# Patient Record
Sex: FEMALE | Race: OTHER | HISPANIC OR LATINO | URBAN - METROPOLITAN AREA
[De-identification: names, ages, dates, MRNs, and addresses within clinical notes are randomized per-mention and may not be internally consistent; named-entity substitution may affect disease eponyms.]

---

## 2017-09-29 ENCOUNTER — EMERGENCY (EMERGENCY)
Facility: HOSPITAL | Age: 32
LOS: 1 days | Discharge: HOME | End: 2017-09-29
Attending: EMERGENCY MEDICINE | Admitting: EMERGENCY MEDICINE
Payer: COMMERCIAL

## 2017-09-29 VITALS
DIASTOLIC BLOOD PRESSURE: 75 MMHG | SYSTOLIC BLOOD PRESSURE: 104 MMHG | OXYGEN SATURATION: 100 % | TEMPERATURE: 99 F | HEART RATE: 74 BPM | RESPIRATION RATE: 16 BRPM

## 2017-09-29 VITALS
OXYGEN SATURATION: 100 % | TEMPERATURE: 98 F | DIASTOLIC BLOOD PRESSURE: 68 MMHG | RESPIRATION RATE: 16 BRPM | HEART RATE: 82 BPM | SYSTOLIC BLOOD PRESSURE: 102 MMHG

## 2017-09-29 LAB
APPEARANCE UR: SIGNIFICANT CHANGE UP
BILIRUB UR-MCNC: NEGATIVE — SIGNIFICANT CHANGE UP
BLOOD UR QL VISUAL: HIGH
COLOR SPEC: SIGNIFICANT CHANGE UP
GLUCOSE UR-MCNC: NEGATIVE — SIGNIFICANT CHANGE UP
KETONES UR-MCNC: NEGATIVE — SIGNIFICANT CHANGE UP
LEUKOCYTE ESTERASE UR-ACNC: HIGH
MUCOUS THREADS # UR AUTO: SIGNIFICANT CHANGE UP
NITRITE UR-MCNC: NEGATIVE — SIGNIFICANT CHANGE UP
NON-SQ EPI CELLS # UR AUTO: <1 — SIGNIFICANT CHANGE UP
PH UR: 7 — SIGNIFICANT CHANGE UP (ref 4.6–8)
PROT UR-MCNC: 30 — HIGH
RBC CASTS # UR COMP ASSIST: >50 — HIGH (ref 0–?)
SP GR SPEC: 1.01 — SIGNIFICANT CHANGE UP (ref 1–1.03)
SQUAMOUS # UR AUTO: SIGNIFICANT CHANGE UP
UROBILINOGEN FLD QL: NORMAL E.U. — SIGNIFICANT CHANGE UP (ref 0.1–0.2)
WBC CLUMPS #/AREA URNS HPF: PRESENT — HIGH (ref 0–?)
WBC UR QL: SIGNIFICANT CHANGE UP (ref 0–?)

## 2017-09-29 PROCEDURE — 99284 EMERGENCY DEPT VISIT MOD MDM: CPT

## 2017-09-29 PROCEDURE — 74176 CT ABD & PELVIS W/O CONTRAST: CPT | Mod: 26,GC

## 2017-09-29 RX ORDER — CEPHALEXIN 500 MG
1 CAPSULE ORAL
Qty: 10 | Refills: 0
Start: 2017-09-29 | End: 2017-10-04

## 2017-09-29 NOTE — ED PROVIDER NOTE - MEDICAL DECISION MAKING DETAILS
32 yo F presenting with sudden onset L lateral mid back pain radiating to the R mid lateral back and the LUQ, RUQ, and epigastrium with associated NBNB emesis. Pt had dysuria and frequency yesterday. UA, UPT pending. Will consider US vs CT abdomen to r/o nephrolithiasis. Will reassess.

## 2017-09-29 NOTE — ED ADULT NURSE NOTE - OBJECTIVE STATEMENT
Pt. A&Ox4, c/o b/l flank pain starting this AM with dysuria, nausea and urinary frequency. Went to UC this morning and then sent to ER for CT. Denies any fever/chills, abdominal pain or diarrhea. Urine sent. Awaiting CT. VSS at this time. Will continue to monitor.

## 2017-09-29 NOTE — ED PROVIDER NOTE - CARDIAC, MLM
Normal rate, regular rhythm.  Heart sounds S1, S2.  No murmurs, rubs or gallops. no pedal edema bilat

## 2017-09-29 NOTE — ED PROVIDER NOTE - OBJECTIVE STATEMENT
32 yo F presenting with 5 hour hx of intermittent sharp L lateral mid back pain that radiates to the R lateral mid back and to the LUQ, RUQ and epigastric regions of the abdomen that lasts a few hours each time ranging from 1-9/10 in severity that woke pt up this morning. Pt did not take any meds for pain. Walking alleviates the pain. No hx of sx.     Meds: MVI  PCP/GYN: Saskia  Pharm: [id number] [address]

## 2017-09-29 NOTE — ED PROVIDER NOTE - ATTENDING CONTRIBUTION TO CARE
Seen and examined, no pain at time of exam, refusing pain meds, acute onset of flank pain, episodic, no fever/chills, no sick contacts, no hematemesis/coffee grounds, no hx of ovarian cyst or irreg. period. In ED pt. NAD, MMM, clear lungs, soft, flat, NT abd, no hernia, +mild L CVAT, no edema, NT calves.

## 2017-09-29 NOTE — ED PROVIDER NOTE - PROGRESS NOTE DETAILS
Pt states her pain is improved.  CT a/p shows no nephrolithiasis or hydronephrosis. UA shows leukocyte esterase and WBC clump. Will d/c pt with rx for keflex x 5 days and return precautions for lower quadrant and/or back tenderness. Pt states her pain is improved. No recurrent pain for hrs. in ED.  CT a/p shows no nephrolithiasis or hydronephrosis. Repeat exam normal. Unclear if passed stone or other etiology. UA shows leukocyte esterase and WBC clump. Will d/c pt with rx for keflex x 5 days and return precautions for lower quadrant and/or back tenderness.

## 2017-09-29 NOTE — ED PROVIDER NOTE - CARE PLAN
Principal Discharge DX:	Flank pain  Instructions for follow-up, activity and diet:	1. You were seen for back pain and flank pain. Your CT scan showed no kidney stones. Your urine showed some white blood cells.  2. Take motrin or ibuprofen for your pain. Take keflex as prescribed from your pharmacy.   3. Follow up with your primary care physician within 48 hours.  4. Return to the emergency department immediately if you experience back pain, abdominal pain, fever, fainting, or bleeding.

## 2017-09-29 NOTE — ED PROVIDER NOTE - PLAN OF CARE
1. You were seen for back pain and flank pain. Your CT scan showed no kidney stones. Your urine showed some white blood cells.  2. Take motrin or ibuprofen for your pain. Take keflex as prescribed from your pharmacy.   3. Follow up with your primary care physician within 48 hours.  4. Return to the emergency department immediately if you experience back pain, abdominal pain, fever, fainting, or bleeding.

## 2018-01-19 ENCOUNTER — EMERGENCY (EMERGENCY)
Facility: HOSPITAL | Age: 33
LOS: 1 days | Discharge: HOME | End: 2018-01-19
Admitting: EMERGENCY MEDICINE
Payer: COMMERCIAL

## 2018-01-19 VITALS
RESPIRATION RATE: 18 BRPM | SYSTOLIC BLOOD PRESSURE: 118 MMHG | HEART RATE: 89 BPM | DIASTOLIC BLOOD PRESSURE: 83 MMHG | TEMPERATURE: 98 F | OXYGEN SATURATION: 100 %

## 2018-01-19 VITALS
RESPIRATION RATE: 16 BRPM | DIASTOLIC BLOOD PRESSURE: 93 MMHG | OXYGEN SATURATION: 98 % | TEMPERATURE: 98 F | SYSTOLIC BLOOD PRESSURE: 129 MMHG | HEART RATE: 74 BPM

## 2018-01-19 LAB
ALBUMIN SERPL ELPH-MCNC: 4.3 G/DL — SIGNIFICANT CHANGE UP (ref 3.3–5)
ALP SERPL-CCNC: 43 U/L — SIGNIFICANT CHANGE UP (ref 40–120)
ALT FLD-CCNC: 21 U/L — SIGNIFICANT CHANGE UP (ref 4–33)
APPEARANCE UR: SIGNIFICANT CHANGE UP
AST SERPL-CCNC: 26 U/L — SIGNIFICANT CHANGE UP (ref 4–32)
BACTERIA # UR AUTO: SIGNIFICANT CHANGE UP
BASOPHILS # BLD AUTO: 0.05 K/UL — SIGNIFICANT CHANGE UP (ref 0–0.2)
BASOPHILS NFR BLD AUTO: 0.7 % — SIGNIFICANT CHANGE UP (ref 0–2)
BILIRUB SERPL-MCNC: 0.3 MG/DL — SIGNIFICANT CHANGE UP (ref 0.2–1.2)
BILIRUB UR-MCNC: NEGATIVE — SIGNIFICANT CHANGE UP
BLD GP AB SCN SERPL QL: NEGATIVE — SIGNIFICANT CHANGE UP
BLOOD UR QL VISUAL: HIGH
BUN SERPL-MCNC: 10 MG/DL — SIGNIFICANT CHANGE UP (ref 7–23)
CALCIUM SERPL-MCNC: 9.4 MG/DL — SIGNIFICANT CHANGE UP (ref 8.4–10.5)
CHLORIDE SERPL-SCNC: 102 MMOL/L — SIGNIFICANT CHANGE UP (ref 98–107)
CO2 SERPL-SCNC: 23 MMOL/L — SIGNIFICANT CHANGE UP (ref 22–31)
COLOR SPEC: SIGNIFICANT CHANGE UP
CREAT SERPL-MCNC: 0.66 MG/DL — SIGNIFICANT CHANGE UP (ref 0.5–1.3)
EOSINOPHIL # BLD AUTO: 0.12 K/UL — SIGNIFICANT CHANGE UP (ref 0–0.5)
EOSINOPHIL NFR BLD AUTO: 1.8 % — SIGNIFICANT CHANGE UP (ref 0–6)
GLUCOSE SERPL-MCNC: 82 MG/DL — SIGNIFICANT CHANGE UP (ref 70–99)
GLUCOSE UR-MCNC: NEGATIVE — SIGNIFICANT CHANGE UP
HCG SERPL-ACNC: 123.8 MIU/ML — SIGNIFICANT CHANGE UP
HCT VFR BLD CALC: 36.5 % — SIGNIFICANT CHANGE UP (ref 34.5–45)
HGB BLD-MCNC: 12.1 G/DL — SIGNIFICANT CHANGE UP (ref 11.5–15.5)
IMM GRANULOCYTES # BLD AUTO: 0.01 # — SIGNIFICANT CHANGE UP
IMM GRANULOCYTES NFR BLD AUTO: 0.1 % — SIGNIFICANT CHANGE UP (ref 0–1.5)
KETONES UR-MCNC: NEGATIVE — SIGNIFICANT CHANGE UP
LEUKOCYTE ESTERASE UR-ACNC: HIGH
LYMPHOCYTES # BLD AUTO: 2.57 K/UL — SIGNIFICANT CHANGE UP (ref 1–3.3)
LYMPHOCYTES # BLD AUTO: 37.9 % — SIGNIFICANT CHANGE UP (ref 13–44)
MCHC RBC-ENTMCNC: 30.8 PG — SIGNIFICANT CHANGE UP (ref 27–34)
MCHC RBC-ENTMCNC: 33.2 % — SIGNIFICANT CHANGE UP (ref 32–36)
MCV RBC AUTO: 92.9 FL — SIGNIFICANT CHANGE UP (ref 80–100)
MONOCYTES # BLD AUTO: 0.52 K/UL — SIGNIFICANT CHANGE UP (ref 0–0.9)
MONOCYTES NFR BLD AUTO: 7.7 % — SIGNIFICANT CHANGE UP (ref 2–14)
NEUTROPHILS # BLD AUTO: 3.51 K/UL — SIGNIFICANT CHANGE UP (ref 1.8–7.4)
NEUTROPHILS NFR BLD AUTO: 51.8 % — SIGNIFICANT CHANGE UP (ref 43–77)
NITRITE UR-MCNC: NEGATIVE — SIGNIFICANT CHANGE UP
NON-SQ EPI CELLS # UR AUTO: <1 — SIGNIFICANT CHANGE UP
NRBC # FLD: 0 — SIGNIFICANT CHANGE UP
PH UR: 7.5 — SIGNIFICANT CHANGE UP (ref 4.6–8)
PLATELET # BLD AUTO: 221 K/UL — SIGNIFICANT CHANGE UP (ref 150–400)
PMV BLD: 10.8 FL — SIGNIFICANT CHANGE UP (ref 7–13)
POTASSIUM SERPL-MCNC: 3.8 MMOL/L — SIGNIFICANT CHANGE UP (ref 3.5–5.3)
POTASSIUM SERPL-SCNC: 3.8 MMOL/L — SIGNIFICANT CHANGE UP (ref 3.5–5.3)
PROT SERPL-MCNC: 7.6 G/DL — SIGNIFICANT CHANGE UP (ref 6–8.3)
PROT UR-MCNC: 30 MG/DL — HIGH
RBC # BLD: 3.93 M/UL — SIGNIFICANT CHANGE UP (ref 3.8–5.2)
RBC # FLD: 13 % — SIGNIFICANT CHANGE UP (ref 10.3–14.5)
RBC CASTS # UR COMP ASSIST: >50 — HIGH (ref 0–?)
RH IG SCN BLD-IMP: POSITIVE — SIGNIFICANT CHANGE UP
SODIUM SERPL-SCNC: 141 MMOL/L — SIGNIFICANT CHANGE UP (ref 135–145)
SP GR SPEC: 1.01 — SIGNIFICANT CHANGE UP (ref 1–1.04)
SQUAMOUS # UR AUTO: SIGNIFICANT CHANGE UP
UROBILINOGEN FLD QL: NORMAL MG/DL — SIGNIFICANT CHANGE UP
WBC # BLD: 6.78 K/UL — SIGNIFICANT CHANGE UP (ref 3.8–10.5)
WBC # FLD AUTO: 6.78 K/UL — SIGNIFICANT CHANGE UP (ref 3.8–10.5)
WBC UR QL: SIGNIFICANT CHANGE UP (ref 0–?)

## 2018-01-19 PROCEDURE — 99285 EMERGENCY DEPT VISIT HI MDM: CPT

## 2018-01-19 PROCEDURE — 76830 TRANSVAGINAL US NON-OB: CPT | Mod: 26

## 2018-01-19 RX ORDER — NITROFURANTOIN MACROCRYSTAL 50 MG
1 CAPSULE ORAL
Qty: 14 | Refills: 0
Start: 2018-01-19 | End: 2018-01-25

## 2018-01-19 NOTE — ED PROVIDER NOTE - OBJECTIVE STATEMENT
32 year old female ( LMP 2017) no PMHx  c/o vaginal bleeding x 3 days. Patient reports she went to gyn  to confirm pregnancy via urine pregnancy test and beta hcg 50. Patient reports 6 days ago had severe cramping radiating to her back with 1 episode of vaginal bleeding when wiping. This prompted the patient to go see [facility]. They reported her beta hcg of 306, no pregnancy seen on sonogram. Patient reports she has had dark brown spotting for 3 days, which prompted a repeat sonogram yesterday (no pregnancy was seen).  Today at noon patient began to see bright red blood. Patient reports going through 3 pads in 8 hours with multiple medium sized clots. Patient states she still has cramping but it has decreased. Patient denies syncope, shortness of breath, headache, fevers, chest pain. Patient reports her OB is Dr Moran at [facility] in [facility].

## 2018-01-19 NOTE — ED ADULT TRIAGE NOTE - CHIEF COMPLAINT QUOTE
6 weeks pregnant with c/o lower abdominal cramping,  vaginal spotting for 3-4 days, and now having bright red vaginal bleeding.  LMP: 12/06/17.

## 2018-01-19 NOTE — ED PROVIDER NOTE - MEDICAL DECISION MAKING DETAILS
32 year old female ( LMP 2017) no PMHx  c/o vaginal bleeding x 3 days. Will order sonogram, type and screen, UA, beta hcg, CBC, CMP 32 year old female ( LMP 2017) no PMHx  c/o vaginal bleeding x 3 days. Will order US type and screen, UA, beta hcg, CBC, CMP, obgyn

## 2018-01-19 NOTE — ED PROVIDER NOTE - PROGRESS NOTE DETAILS
OBGYN seen pt at bedside. Pt stable for discharge and to follow up with private OBGYN on Monday. Ua reviewed. UC sent. Pt stable for discharge at this time.

## 2018-01-20 NOTE — CONSULT NOTE ADULT - SUBJECTIVE AND OBJECTIVE BOX
32  LMP 2017,+hcg, no PMHx  c/o vaginal bleeding x 3 days. Patient reports she went to gyn  to confirm pregnancy via urine pregnancy test and beta hcg 50. Patient reports 6 days ago had severe cramping radiating to her back with 1 episode of vaginal bleeding when wiping. This prompted the patient to go see [facility]. They reported her beta hcg of 306, no pregnancy seen on sonogram. Patient reports she has had dark brown spotting for 3 days, which prompted a repeat sonogram yesterday (no pregnancy was seen).  Today at noon patient began to see bright red blood. Patient reports going through 3 pads in 8 hours with multiple medium sized clots. Patient states she still has cramping but it has decreased. Had another bHCG perfromed yesterday which was 170. Today in ED, bhCG is 123. Patient denies syncope, shortness of breath, headache, fevers, chest pain.     PAST MEDICAL & SURGICAL HISTORY:  Gastritis  No significant past surgical history    Allergies    No Known Allergies    Intolerances      MEDICATIONS  (STANDING):    MEDICATIONS  (PRN):    FAMILY HISTORY:  Family history of essential hypertension (Father)           Vital Signs Last 24 Hrs  T(C): 36.8 (2018 23:01), Max: 36.9 (2018 17:15)  T(F): 98.3 (2018 23:01), Max: 98.4 (2018 17:15)  HR: 89 (2018 23:01) (74 - 89)  BP: 118/83 (2018 23:01) (118/83 - 129/93)  BP(mean): --  RR: 18 (2018 23:01) (16 - 18)  SpO2: 100% (2018 23:01) (98% - 100%)    PHYSICAL EXAM:      Constitutional: alert and oriented x 3    Breasts: no tenderness, no nodules    Respiratory: clear    Cardiovascular: regular rate and rhythm    Gastrointestinal: soft, non tender, + bowel sounds. No hepatosplenomegaly, no palpable masses    Genitourinary: NEFG  Cervix: closed/ long, no CMT, slight bleeding seen at cervical os  Uterus: normal size, non tender,  Adnexa: non tender, no palpable masses        LABS:                        12.1   6.78  )-----------( 221      ( 2018 19:47 )             36.5         141  |  102  |  10  ----------------------------<  82  3.8   |  23  |  0.66    Ca    9.4      2018 19:47    TPro  7.6  /  Alb  4.3  /  TBili  0.3  /  DBili  x   /  AST  26  /  ALT  21  /  AlkPhos  43        Urinalysis Basic - ( 2018 19:47 )    Color: PLYEL / Appearance: HAZY / S.007 / pH: 7.5  Gluc: NEGATIVE / Ketone: NEGATIVE  / Bili: NEGATIVE / Urobili: NORMAL mg/dL   Blood: LARGE / Protein: 30 mg/dL / Nitrite: NEGATIVE   Leuk Esterase: SMALL / RBC: >50 / WBC 10-25   Sq Epi: OCC / Non Sq Epi: x / Bacteria: FEW        Blood Type: A Positive      RADIOLOGY & ADDITIONAL STUDIES:  < from: US Transvaginal (18 @ 19:21) >    FINDINGS:    Uterus: 7.1 x 3.9 x 3.9 cm. Retroverted. No intrauterine gestational sac.    Endometrium: 5 mm. Within normal limits.    Right ovary: Not visualized.    Left ovary: 2.6 x 1.2 x 1.3 cm. Multiple collapsed small bowel loops and   trace fluid with low-level echoes noted in the left adnexa. No discrete   adnexal mass is visualized.    Fluid: Trace fluid in the left adnexa.    IMPRESSION:  No intrauterine pregnancy seen at this time.   Trace free fluid with echoes and multiple collapsed loops of small bowel   noted in the left adnexa. Although no discrete mass is evident, close   interval follow-up beta hCG and ultrasound are recommended to exclude   left adnexal ectopic pregnancy.    < end of copied text >

## 2018-01-20 NOTE — CONSULT NOTE ADULT - ASSESSMENT
32  LMP 2017,+hcg, no PMHx  c/o vaginal bleeding x 3 days w/downtrending bhcg, sab vs ectopic pregnancy.    -Given benign exam, stable vitals, no acute concern for ectopic pregnancy at this time  -Ectopic precautions given  -Follow up with Dr. Hall on  for repeat bHCG    PER Sarmiento, PGY2  Seen w/Dr. Brothers

## 2018-01-21 LAB
BACTERIA UR CULT: SIGNIFICANT CHANGE UP
SPECIMEN SOURCE: SIGNIFICANT CHANGE UP

## 2018-11-02 ENCOUNTER — TRANSCRIPTION ENCOUNTER (OUTPATIENT)
Age: 33
End: 2018-11-02

## 2018-11-24 ENCOUNTER — RESULT REVIEW (OUTPATIENT)
Age: 33
End: 2018-11-24

## 2019-02-11 ENCOUNTER — TRANSCRIPTION ENCOUNTER (OUTPATIENT)
Age: 34
End: 2019-02-11

## 2019-08-29 PROBLEM — K29.70 GASTRITIS, UNSPECIFIED, WITHOUT BLEEDING: Chronic | Status: ACTIVE | Noted: 2017-09-29

## 2019-09-11 ENCOUNTER — APPOINTMENT (OUTPATIENT)
Dept: ANTEPARTUM | Facility: CLINIC | Age: 34
End: 2019-09-11
Payer: COMMERCIAL

## 2019-09-11 ENCOUNTER — ASOB RESULT (OUTPATIENT)
Age: 34
End: 2019-09-11

## 2019-09-11 ENCOUNTER — APPOINTMENT (OUTPATIENT)
Dept: ANTEPARTUM | Facility: CLINIC | Age: 34
End: 2019-09-11

## 2019-09-11 PROCEDURE — 36415 COLL VENOUS BLD VENIPUNCTURE: CPT

## 2019-09-11 PROCEDURE — 76813 OB US NUCHAL MEAS 1 GEST: CPT

## 2019-09-11 PROCEDURE — 99201 OFFICE OUTPATIENT NEW 10 MINUTES: CPT | Mod: 25

## 2019-09-11 PROCEDURE — 76801 OB US < 14 WKS SINGLE FETUS: CPT

## 2019-09-11 PROCEDURE — 36416 COLLJ CAPILLARY BLOOD SPEC: CPT

## 2019-09-16 ENCOUNTER — TRANSCRIPTION ENCOUNTER (OUTPATIENT)
Age: 34
End: 2019-09-16

## 2019-10-09 ENCOUNTER — APPOINTMENT (OUTPATIENT)
Dept: ANTEPARTUM | Facility: CLINIC | Age: 34
End: 2019-10-09

## 2019-11-05 ENCOUNTER — APPOINTMENT (OUTPATIENT)
Dept: ANTEPARTUM | Facility: CLINIC | Age: 34
End: 2019-11-05
Payer: COMMERCIAL

## 2019-11-05 ENCOUNTER — ASOB RESULT (OUTPATIENT)
Age: 34
End: 2019-11-05

## 2019-11-05 PROCEDURE — 76817 TRANSVAGINAL US OBSTETRIC: CPT | Mod: 59

## 2019-11-05 PROCEDURE — 76811 OB US DETAILED SNGL FETUS: CPT

## 2020-03-22 ENCOUNTER — RESULT REVIEW (OUTPATIENT)
Age: 35
End: 2020-03-22

## 2020-03-22 ENCOUNTER — INPATIENT (INPATIENT)
Facility: HOSPITAL | Age: 35
LOS: 2 days | Discharge: HOME | End: 2020-03-25
Attending: SPECIALIST | Admitting: SPECIALIST
Payer: COMMERCIAL

## 2020-03-22 ENCOUNTER — TRANSCRIPTION ENCOUNTER (OUTPATIENT)
Age: 35
End: 2020-03-22

## 2020-03-22 VITALS
HEART RATE: 85 BPM | SYSTOLIC BLOOD PRESSURE: 130 MMHG | TEMPERATURE: 99 F | RESPIRATION RATE: 16 BRPM | DIASTOLIC BLOOD PRESSURE: 83 MMHG

## 2020-03-22 DIAGNOSIS — Z98.890 OTHER SPECIFIED POSTPROCEDURAL STATES: Chronic | ICD-10-CM

## 2020-03-22 DIAGNOSIS — Z3A.00 WEEKS OF GESTATION OF PREGNANCY NOT SPECIFIED: ICD-10-CM

## 2020-03-22 DIAGNOSIS — O26.899 OTHER SPECIFIED PREGNANCY RELATED CONDITIONS, UNSPECIFIED TRIMESTER: ICD-10-CM

## 2020-03-22 LAB
BASOPHILS # BLD AUTO: 0.02 K/UL — SIGNIFICANT CHANGE UP (ref 0–0.2)
BASOPHILS NFR BLD AUTO: 0.2 % — SIGNIFICANT CHANGE UP (ref 0–2)
BLD GP AB SCN SERPL QL: NEGATIVE — SIGNIFICANT CHANGE UP
EOSINOPHIL # BLD AUTO: 0.04 K/UL — SIGNIFICANT CHANGE UP (ref 0–0.5)
EOSINOPHIL NFR BLD AUTO: 0.4 % — SIGNIFICANT CHANGE UP (ref 0–6)
HCT VFR BLD CALC: 35.3 % — SIGNIFICANT CHANGE UP (ref 34.5–45)
HGB BLD-MCNC: 12.4 G/DL — SIGNIFICANT CHANGE UP (ref 11.5–15.5)
IMM GRANULOCYTES NFR BLD AUTO: 0.4 % — SIGNIFICANT CHANGE UP (ref 0–1.5)
LYMPHOCYTES # BLD AUTO: 1.51 K/UL — SIGNIFICANT CHANGE UP (ref 1–3.3)
LYMPHOCYTES # BLD AUTO: 17 % — SIGNIFICANT CHANGE UP (ref 13–44)
MCHC RBC-ENTMCNC: 33.1 PG — SIGNIFICANT CHANGE UP (ref 27–34)
MCHC RBC-ENTMCNC: 35.1 % — SIGNIFICANT CHANGE UP (ref 32–36)
MCV RBC AUTO: 94.1 FL — SIGNIFICANT CHANGE UP (ref 80–100)
MONOCYTES # BLD AUTO: 0.71 K/UL — SIGNIFICANT CHANGE UP (ref 0–0.9)
MONOCYTES NFR BLD AUTO: 8 % — SIGNIFICANT CHANGE UP (ref 2–14)
NEUTROPHILS # BLD AUTO: 6.57 K/UL — SIGNIFICANT CHANGE UP (ref 1.8–7.4)
NEUTROPHILS NFR BLD AUTO: 74 % — SIGNIFICANT CHANGE UP (ref 43–77)
NRBC # FLD: 0 K/UL — SIGNIFICANT CHANGE UP (ref 0–0)
PLATELET # BLD AUTO: 141 K/UL — LOW (ref 150–400)
PMV BLD: 11.7 FL — SIGNIFICANT CHANGE UP (ref 7–13)
RBC # BLD: 3.75 M/UL — LOW (ref 3.8–5.2)
RBC # FLD: 13.2 % — SIGNIFICANT CHANGE UP (ref 10.3–14.5)
RH IG SCN BLD-IMP: POSITIVE — SIGNIFICANT CHANGE UP
WBC # BLD: 8.89 K/UL — SIGNIFICANT CHANGE UP (ref 3.8–10.5)
WBC # FLD AUTO: 8.89 K/UL — SIGNIFICANT CHANGE UP (ref 3.8–10.5)

## 2020-03-22 RX ORDER — OXYTOCIN 10 UNIT/ML
2 VIAL (ML) INJECTION
Qty: 30 | Refills: 0 | Status: DISCONTINUED | OUTPATIENT
Start: 2020-03-22 | End: 2020-03-22

## 2020-03-22 RX ORDER — FAMOTIDINE 10 MG/ML
20 INJECTION INTRAVENOUS ONCE
Refills: 0 | Status: COMPLETED | OUTPATIENT
Start: 2020-03-22 | End: 2020-03-22

## 2020-03-22 RX ORDER — FAMOTIDINE 10 MG/ML
20 INJECTION INTRAVENOUS ONCE
Refills: 0 | Status: DISCONTINUED | OUTPATIENT
Start: 2020-03-22 | End: 2020-03-22

## 2020-03-22 RX ORDER — SODIUM CHLORIDE 9 MG/ML
1000 INJECTION, SOLUTION INTRAVENOUS
Refills: 0 | Status: DISCONTINUED | OUTPATIENT
Start: 2020-03-22 | End: 2020-03-23

## 2020-03-22 RX ORDER — SODIUM CHLORIDE 9 MG/ML
500 INJECTION, SOLUTION INTRAVENOUS ONCE
Refills: 0 | Status: COMPLETED | OUTPATIENT
Start: 2020-03-22 | End: 2020-03-22

## 2020-03-22 RX ORDER — OXYTOCIN 10 UNIT/ML
VIAL (ML) INJECTION
Qty: 20 | Refills: 0 | Status: COMPLETED | OUTPATIENT
Start: 2020-03-22 | End: 2020-03-22

## 2020-03-22 RX ADMIN — SODIUM CHLORIDE 500 MILLILITER(S): 9 INJECTION, SOLUTION INTRAVENOUS at 20:30

## 2020-03-22 RX ADMIN — FAMOTIDINE 20 MILLIGRAM(S): 10 INJECTION INTRAVENOUS at 23:33

## 2020-03-22 RX ADMIN — Medication 2 MILLIUNIT(S)/MIN: at 21:15

## 2020-03-22 NOTE — CHART NOTE - NSCHARTNOTEFT_GEN_A_CORE
PGY1 Note      Patient evaluated at bedside for fetal tachycardia. She is s/p 1L bolus prior to epidural at 7 pm, another 500 cc bolus at 8pm. FHR is elevated to 180s; patient is afebrile.      VS  T(C): 36.9 (20 @ 20:23)  HR: 59 (20 @ 21:10)  BP: 114/66 (20 @ 21:07)  RR: 18 (20 @ 18:02)  SpO2: 100% (20 @ 21:10)      SVE: 5/90/-2; AROM, light mec   FHR:180/mod cydney/-acel/-decel: cat 2   Goltry: ctx q 2-3 mins      Patient is afebrile; will give another 500 cc bolus for fetal tachycardia   -AROM  -Will start pitocin  -anticipate   -Continuos fetal monitoring    d/w Dr. Christopher Brown PGY1

## 2020-03-22 NOTE — OB PROVIDER TRIAGE NOTE - NSOBPROVIDERNOTE_OBGYN_ALL_OB_FT
This is a 34 year old  at 40.2 weeks gestational age presents with complaints of contractions q4-6 minutes, denies LOF, VB. patients requesting epidural for pain management.  denies fever, cough, sick contact.   ap course uncomplicated as per patient  gbs neg      med: gastritis  surg: D&C x 1  gyn: sab x 2, D&C x 1  2016 LEEP  ob: denies    current meds: pnv, folic acid    nkda    assessment  Vital Signs Last 24 Hrs  T(C): 37.4 (22 Mar 2020 16:41), Max: 37.4 (22 Mar 2020 16:23)  T(F): 99.32 (22 Mar 2020 16:41), Max: 99.32 (22 Mar 2020 16:41)  HR: 85 (22 Mar 2020 16:43) (85 - 85)  BP: 130/83 (22 Mar 2020 16:43) (130/83 - 130/83)  BP(mean): --  RR: 18 (22 Mar 2020 16:41) (16 - 18)  SpO2: --    sve 4/80/-1  resp: lungs clear bilaterally    Cat 1 EFM    plan discussed with dr rodriguez  admit for labor  approved for epidural prn   routine orders

## 2020-03-22 NOTE — OB PROVIDER TRIAGE NOTE - HISTORY OF PRESENT ILLNESS
This is a 34 year old  at 40.2 weeks gestational age presents with complaints of contractions q4-6 minutes, denies LOF, VB. patients requesting epidural for pain management.  denies fever, cough, sick contact.   ap course uncomplicated as per patient  gbs neg

## 2020-03-22 NOTE — OB PROVIDER H&P - ASSESSMENT
This is a 34 year old  at 40.2 weeks gestational age admitted for labor    plan discussed with dr rodriguez  admit for labor  approved for epidural prn   routine orders

## 2020-03-22 NOTE — OB PROVIDER TRIAGE NOTE - NSHPPHYSICALEXAM_GEN_ALL_CORE
Vital Signs Last 24 Hrs  T(C): 37.4 (22 Mar 2020 16:41), Max: 37.4 (22 Mar 2020 16:23)  T(F): 99.32 (22 Mar 2020 16:41), Max: 99.32 (22 Mar 2020 16:41)  HR: 85 (22 Mar 2020 16:43) (85 - 85)  BP: 130/83 (22 Mar 2020 16:43) (130/83 - 130/83)  BP(mean): --  RR: 18 (22 Mar 2020 16:41) (16 - 18)  SpO2: --    sve 4/80/-1  resp: lungs clear bilaterally

## 2020-03-23 LAB — T PALLIDUM AB TITR SER: NEGATIVE — SIGNIFICANT CHANGE UP

## 2020-03-23 PROCEDURE — 88307 TISSUE EXAM BY PATHOLOGIST: CPT | Mod: 26

## 2020-03-23 RX ORDER — SIMETHICONE 80 MG/1
80 TABLET, CHEWABLE ORAL EVERY 4 HOURS
Refills: 0 | Status: DISCONTINUED | OUTPATIENT
Start: 2020-03-23 | End: 2020-03-25

## 2020-03-23 RX ORDER — GENTAMICIN SULFATE 40 MG/ML
350 VIAL (ML) INJECTION ONCE
Refills: 0 | Status: COMPLETED | OUTPATIENT
Start: 2020-03-23 | End: 2020-03-23

## 2020-03-23 RX ORDER — MAGNESIUM HYDROXIDE 400 MG/1
30 TABLET, CHEWABLE ORAL
Refills: 0 | Status: DISCONTINUED | OUTPATIENT
Start: 2020-03-23 | End: 2020-03-25

## 2020-03-23 RX ORDER — ACETAMINOPHEN 500 MG
975 TABLET ORAL
Refills: 0 | Status: DISCONTINUED | OUTPATIENT
Start: 2020-03-23 | End: 2020-03-25

## 2020-03-23 RX ORDER — BENZOCAINE 10 %
1 GEL (GRAM) MUCOUS MEMBRANE EVERY 6 HOURS
Refills: 0 | Status: DISCONTINUED | OUTPATIENT
Start: 2020-03-23 | End: 2020-03-25

## 2020-03-23 RX ORDER — OXYCODONE HYDROCHLORIDE 5 MG/1
5 TABLET ORAL
Refills: 0 | Status: DISCONTINUED | OUTPATIENT
Start: 2020-03-23 | End: 2020-03-25

## 2020-03-23 RX ORDER — AMPICILLIN TRIHYDRATE 250 MG
2 CAPSULE ORAL EVERY 6 HOURS
Refills: 0 | Status: DISCONTINUED | OUTPATIENT
Start: 2020-03-23 | End: 2020-03-23

## 2020-03-23 RX ORDER — TETANUS TOXOID, REDUCED DIPHTHERIA TOXOID AND ACELLULAR PERTUSSIS VACCINE, ADSORBED 5; 2.5; 8; 8; 2.5 [IU]/.5ML; [IU]/.5ML; UG/.5ML; UG/.5ML; UG/.5ML
0.5 SUSPENSION INTRAMUSCULAR ONCE
Refills: 0 | Status: DISCONTINUED | OUTPATIENT
Start: 2020-03-23 | End: 2020-03-25

## 2020-03-23 RX ORDER — AMPICILLIN TRIHYDRATE 250 MG
2 CAPSULE ORAL ONCE
Refills: 0 | Status: COMPLETED | OUTPATIENT
Start: 2020-03-23 | End: 2020-03-23

## 2020-03-23 RX ORDER — SODIUM CHLORIDE 9 MG/ML
3 INJECTION INTRAMUSCULAR; INTRAVENOUS; SUBCUTANEOUS EVERY 8 HOURS
Refills: 0 | Status: DISCONTINUED | OUTPATIENT
Start: 2020-03-23 | End: 2020-03-25

## 2020-03-23 RX ORDER — IBUPROFEN 200 MG
600 TABLET ORAL EVERY 6 HOURS
Refills: 0 | Status: DISCONTINUED | OUTPATIENT
Start: 2020-03-23 | End: 2020-03-25

## 2020-03-23 RX ORDER — SODIUM CHLORIDE 9 MG/ML
1000 INJECTION, SOLUTION INTRAVENOUS ONCE
Refills: 0 | Status: COMPLETED | OUTPATIENT
Start: 2020-03-23 | End: 2020-03-23

## 2020-03-23 RX ORDER — LANOLIN
1 OINTMENT (GRAM) TOPICAL EVERY 6 HOURS
Refills: 0 | Status: DISCONTINUED | OUTPATIENT
Start: 2020-03-23 | End: 2020-03-25

## 2020-03-23 RX ORDER — ACETAMINOPHEN 500 MG
975 TABLET ORAL ONCE
Refills: 0 | Status: COMPLETED | OUTPATIENT
Start: 2020-03-23 | End: 2020-03-23

## 2020-03-23 RX ORDER — OXYTOCIN 10 UNIT/ML
333.33 VIAL (ML) INJECTION
Qty: 20 | Refills: 0 | Status: DISCONTINUED | OUTPATIENT
Start: 2020-03-23 | End: 2020-03-23

## 2020-03-23 RX ORDER — AER TRAVELER 0.5 G/1
1 SOLUTION RECTAL; TOPICAL EVERY 4 HOURS
Refills: 0 | Status: DISCONTINUED | OUTPATIENT
Start: 2020-03-23 | End: 2020-03-25

## 2020-03-23 RX ORDER — DIPHENHYDRAMINE HCL 50 MG
25 CAPSULE ORAL EVERY 6 HOURS
Refills: 0 | Status: DISCONTINUED | OUTPATIENT
Start: 2020-03-23 | End: 2020-03-25

## 2020-03-23 RX ORDER — OXYCODONE HYDROCHLORIDE 5 MG/1
5 TABLET ORAL ONCE
Refills: 0 | Status: DISCONTINUED | OUTPATIENT
Start: 2020-03-23 | End: 2020-03-25

## 2020-03-23 RX ORDER — HYDROCORTISONE 1 %
1 OINTMENT (GRAM) TOPICAL EVERY 6 HOURS
Refills: 0 | Status: DISCONTINUED | OUTPATIENT
Start: 2020-03-23 | End: 2020-03-25

## 2020-03-23 RX ORDER — PRAMOXINE HYDROCHLORIDE 150 MG/15G
1 AEROSOL, FOAM RECTAL EVERY 4 HOURS
Refills: 0 | Status: DISCONTINUED | OUTPATIENT
Start: 2020-03-23 | End: 2020-03-25

## 2020-03-23 RX ORDER — KETOROLAC TROMETHAMINE 30 MG/ML
30 SYRINGE (ML) INJECTION ONCE
Refills: 0 | Status: DISCONTINUED | OUTPATIENT
Start: 2020-03-23 | End: 2020-03-23

## 2020-03-23 RX ORDER — IBUPROFEN 200 MG
600 TABLET ORAL EVERY 6 HOURS
Refills: 0 | Status: COMPLETED | OUTPATIENT
Start: 2020-03-23 | End: 2021-02-19

## 2020-03-23 RX ORDER — GLYCERIN ADULT
1 SUPPOSITORY, RECTAL RECTAL AT BEDTIME
Refills: 0 | Status: DISCONTINUED | OUTPATIENT
Start: 2020-03-23 | End: 2020-03-25

## 2020-03-23 RX ORDER — AMPICILLIN TRIHYDRATE 250 MG
CAPSULE ORAL
Refills: 0 | Status: DISCONTINUED | OUTPATIENT
Start: 2020-03-23 | End: 2020-03-23

## 2020-03-23 RX ORDER — DIBUCAINE 1 %
1 OINTMENT (GRAM) RECTAL EVERY 6 HOURS
Refills: 0 | Status: DISCONTINUED | OUTPATIENT
Start: 2020-03-23 | End: 2020-03-25

## 2020-03-23 RX ADMIN — SODIUM CHLORIDE 2000 MILLILITER(S): 9 INJECTION, SOLUTION INTRAVENOUS at 03:49

## 2020-03-23 RX ADMIN — Medication 975 MILLIGRAM(S): at 01:33

## 2020-03-23 RX ADMIN — Medication 600 MILLIGRAM(S): at 17:00

## 2020-03-23 RX ADMIN — Medication 975 MILLIGRAM(S): at 00:42

## 2020-03-23 RX ADMIN — Medication 500 MILLIGRAM(S): at 01:32

## 2020-03-23 RX ADMIN — SODIUM CHLORIDE 3 MILLILITER(S): 9 INJECTION INTRAMUSCULAR; INTRAVENOUS; SUBCUTANEOUS at 06:52

## 2020-03-23 RX ADMIN — Medication 30 MILLIGRAM(S): at 03:16

## 2020-03-23 RX ADMIN — Medication 30 MILLIGRAM(S): at 02:39

## 2020-03-23 RX ADMIN — Medication 600 MILLIGRAM(S): at 16:19

## 2020-03-23 RX ADMIN — SODIUM CHLORIDE 3 MILLILITER(S): 9 INJECTION INTRAMUSCULAR; INTRAVENOUS; SUBCUTANEOUS at 22:40

## 2020-03-23 RX ADMIN — Medication 600 MILLIGRAM(S): at 23:25

## 2020-03-23 RX ADMIN — Medication 600 MILLIGRAM(S): at 22:39

## 2020-03-23 RX ADMIN — Medication 216 GRAM(S): at 00:41

## 2020-03-23 RX ADMIN — Medication 1000 MILLIUNIT(S)/MIN: at 01:33

## 2020-03-23 NOTE — DISCHARGE NOTE OB - CARE PLAN
Principal Discharge DX:	 (normal spontaneous vaginal delivery)  Goal:	GOOD RECOVERY  Assessment and plan of treatment:	F/U 2 WEEKS

## 2020-03-23 NOTE — CHART NOTE - NSCHARTNOTEFT_GEN_A_CORE
Fetal tachycardia noted with early and variable decelerations in setting of tachysystole  Patient examined at bedside    Exam 10/100/+1, pushing  Vitals 39.1C, /81, HR 78, SpO2 %RA    35yo P0 at 40w2d admitted in labor.  Patient now in second stage, with fever and fetal tachycardia c/w intrauterine intraamniotic infection.    - Tylenol 975mg PO now  - Ampicillin/Gentamicin  - continue pushing, anticipate delivery. Possible operative delivery discussed if tracing worsens.  - placental cultures at delivery    Dr. White at bedside  S Giovanny, R4 Fetal tachycardia 170-180bpm noted with early and variable decelerations in setting of tachysystole  Patient examined at bedside    Exam 10/100/+1, pushing  Vitals 39.1C, /81, HR 78, SpO2 %RA    35yo P0 at 40w2d admitted in labor.  Patient now in second stage, with fever and fetal tachycardia c/w intrauterine intraamniotic infection.    - Tylenol 975mg PO now  - Ampicillin/Gentamicin  - continue pushing, anticipate delivery. Possible operative delivery discussed if tracing worsens.  - placental cultures at delivery    Dr. White at bedside  S Ewumi, R4

## 2020-03-23 NOTE — CHART NOTE - NSCHARTNOTEFT_GEN_A_CORE
Patient is 34y  old.    Event : c/o slight SOB    Vital Signs Last 24 Hrs  T(C): 36.7 (23 Mar 2020 16:30), Max: 39.1 (23 Mar 2020 00:31)  T(F): 98 (23 Mar 2020 16:30), Max: 102.38 (23 Mar 2020 00:31)  HR: 77 (23 Mar 2020 16:30) (53 - 145)  BP: 108/68 (23 Mar 2020 16:30) (92/52 - 157/85)  BP(mean): --  RR: 18 (23 Mar 2020 16:30) (16 - 18)  SpO2: 99% (23 Mar 2020 16:30) (84% - 100%)    I&O's Detail    22 Mar 2020 07:01  -  23 Mar 2020 07:00  --------------------------------------------------------  IN:    lactated ringers.: 125 mL    oxytocin Infusion: 1000 mL  Total IN: 1125 mL    OUT:    Estimated Blood Loss: 250 mL    Indwelling Catheter - Urethral: 1500 mL    Voided: 800 mL  Total OUT: 2550 mL    Total NET: -1425 mL      23 Mar 2020 07:01  -  23 Mar 2020 17:38  --------------------------------------------------------  IN:  Total IN: 0 mL    OUT:    Voided: 800 mL  Total OUT: 800 mL    Total NET: -800 mL      Labs:                        12.4   8.89  )-----------( 141      ( 22 Mar 2020 18:03 )             35.3         Evaluation : s/p NVD PPD#0  Intrapartum Temp s/p Amp/Genta x1   c/o slight SOB when she went to the bathroom today, felt 3 times today. No chest pain, palpitation or any lightheadedness. Patient looks comfortable  Heart RRR,   Lungs clear bilaterally  Orthostatic vitals normal, O2 Sat 100% on room air  Fundus firm  Moderate lochia  voiding good    Management and Follow-up plan :  will continue to monitor her closely  Will discuss with Attending               -------------------------------------------------------------------------------------------------------------

## 2020-03-23 NOTE — OB POSTPARTUM EVENT NOTE - NS_EVENTPTSUMMARY1_OBGYN_ALL_OB_FT
lying down 123/73 pulse 54  sitting 109/56 HR 66  standing 106/55 hr 75    Pt complains of mild lightheadness. pt denies complaints of pain. Fundus firm, midline and at umbilicus

## 2020-03-23 NOTE — OB RN DELIVERY SUMMARY - NS_TRUEKNOTA_OBGYN_ALL_OB
Patient A/O x 4, complaining of dizziness x this afternoon and high blood sugar. Patient states she checked her blood sugar today and it was 440mg/dl x 30 minutes ago. Patient states she didn't take her medication for diabetes tonight because, \"I know when it's high I'm supposed to come over here to the hospital\". Patient denies chest pain, N/V, abdominal pain, SOB.
None

## 2020-03-23 NOTE — OB PROVIDER DELIVERY SUMMARY - NSPROVIDERDELIVERYNOTE_OBGYN_ALL_OB_FT
Patient pushing with recurrent decels. Prolonged decel to 120bpm from baseline 170s, and decision made to proceed with RML to expedite delivery. RML performed, with subsequent delivery of fetal head in OA, restituting to EDNA. No nuchal cord, body and shoulders delivered without difficulty.   Placenta delivered intact. RML repaired, no other lacerations noted upon thorough evaluation of vagina, perineum, cervix.     EBL 250cc

## 2020-03-23 NOTE — PROVIDER CONTACT NOTE (OTHER) - ASSESSMENT
v/s BP    108/68 , HR 77  TEMP 36.7  O2  sat 99 % room air  ,RR  16 ,Pt denies chest Pain, dizziness , sore throat , cough .minimal vaginal bleeding , fundus firm , voiding freely , lungs clear , no calf tenderness

## 2020-03-23 NOTE — OB RN DELIVERY SUMMARY - NS_LABORCHARACTER_OBGYN_ALL_OB
Febrile (>38C)/Meconium staining/Chorioamnionitis/Augmentation of labor/External electronic FM/Antibiotics in labor/Fetal intolerance

## 2020-03-23 NOTE — DISCHARGE NOTE OB - PATIENT PORTAL LINK FT
You can access the FollowMyHealth Patient Portal offered by [facility] by registering at the following website: [email or web address]. By joining [employer]’s FollowMyHealth portal, you will also be able to view your health information using other applications (apps) compatible with our system.

## 2020-03-23 NOTE — DISCHARGE NOTE OB - CARE PROVIDER_API CALL
Willie Jo)  Obstetrics and Gynecology  [address] [place] [address]  Phone: [phone]  Fax: [phone]  Follow Up Time:

## 2020-03-23 NOTE — OB POSTPARTUM EVENT NOTE - NS_EVENTPTSUMMARY2_OBGYN_ALL_OB_FT
lying down 100/52 pulse 64  sitting 103/65 HR 59  standing 92/52 hr 88    pt denies complaints of pain, lightheadness or dizziness. Fundus firm, midline and at umbilicus

## 2020-03-23 NOTE — OB RN DELIVERY SUMMARY - NS_SEPSISRSKCALC_OBGYN_ALL_OB_FT
EOS calculated successfully. EOS Risk Factor: 0.5/1000 live births ([facility] national incidence); GA=40w3d; Temp=102.38; ROM=4.367; GBS='Negative'; Antibiotics='No antibiotics or any antibiotics < 2 hrs prior to birth'

## 2020-03-24 RX ORDER — FAMOTIDINE 10 MG/ML
20 INJECTION INTRAVENOUS DAILY
Refills: 0 | Status: DISCONTINUED | OUTPATIENT
Start: 2020-03-24 | End: 2020-03-25

## 2020-03-24 RX ADMIN — Medication 600 MILLIGRAM(S): at 15:55

## 2020-03-24 RX ADMIN — Medication 600 MILLIGRAM(S): at 16:45

## 2020-03-24 RX ADMIN — Medication 600 MILLIGRAM(S): at 22:00

## 2020-03-24 RX ADMIN — FAMOTIDINE 20 MILLIGRAM(S): 10 INJECTION INTRAVENOUS at 22:00

## 2020-03-24 RX ADMIN — Medication 600 MILLIGRAM(S): at 06:56

## 2020-03-24 RX ADMIN — Medication 600 MILLIGRAM(S): at 23:00

## 2020-03-24 RX ADMIN — SODIUM CHLORIDE 3 MILLILITER(S): 9 INJECTION INTRAMUSCULAR; INTRAVENOUS; SUBCUTANEOUS at 06:00

## 2020-03-24 RX ADMIN — Medication 600 MILLIGRAM(S): at 05:58

## 2020-03-24 NOTE — PROGRESS NOTE ADULT - SUBJECTIVE AND OBJECTIVE BOX
S: Patient doing well. Minimal lochia. Pain controlled.    O: Vital Signs Last 24 Hrs  T(C): 36.8 (24 Mar 2020 10:00), Max: 36.8 (24 Mar 2020 10:00)  T(F): 98.3 (24 Mar 2020 10:00), Max: 98.3 (24 Mar 2020 10:00)  HR: 59 (24 Mar 2020 10:00) (58 - 77)  BP: 113/59 (24 Mar 2020 10:00) (108/68 - 121/77)  BP(mean): --  RR: 18 (24 Mar 2020 10:00) (16 - 18)  SpO2: 100% (24 Mar 2020 10:00) (99% - 100%)    Gen: NAD  Abd: soft, NT, ND, fundus firm below umbilicus  Lochia: moderate  Ext: no tenderness    Labs:                        12.4   8.89  )-----------( 141      ( 22 Mar 2020 18:03 )             35.3       A: 34y PPD#1 s/p  doing well.  Plan: Pt seen no complaints .          Dc with instructions for 3/25

## 2020-03-24 NOTE — LACTATION INITIAL EVALUATION - INTERVENTION OUTCOME
Reviewed use of double electric breastpump and hand expression. Safe collection and handling of breastmilk reviewed. Proper cleansing of breast pump parts discussed.

## 2020-03-25 VITALS — HEART RATE: 55 BPM

## 2020-03-25 LAB
BASOPHILS # BLD AUTO: 0.03 K/UL — SIGNIFICANT CHANGE UP (ref 0–0.2)
BASOPHILS NFR BLD AUTO: 0.3 % — SIGNIFICANT CHANGE UP (ref 0–2)
EOSINOPHIL # BLD AUTO: 0.14 K/UL — SIGNIFICANT CHANGE UP (ref 0–0.5)
EOSINOPHIL NFR BLD AUTO: 1.4 % — SIGNIFICANT CHANGE UP (ref 0–6)
HCT VFR BLD CALC: 33 % — LOW (ref 34.5–45)
HGB BLD-MCNC: 11.5 G/DL — SIGNIFICANT CHANGE UP (ref 11.5–15.5)
IMM GRANULOCYTES NFR BLD AUTO: 0.6 % — SIGNIFICANT CHANGE UP (ref 0–1.5)
LYMPHOCYTES # BLD AUTO: 2.83 K/UL — SIGNIFICANT CHANGE UP (ref 1–3.3)
LYMPHOCYTES # BLD AUTO: 28.7 % — SIGNIFICANT CHANGE UP (ref 13–44)
MCHC RBC-ENTMCNC: 33.5 PG — SIGNIFICANT CHANGE UP (ref 27–34)
MCHC RBC-ENTMCNC: 34.8 % — SIGNIFICANT CHANGE UP (ref 32–36)
MCV RBC AUTO: 96.2 FL — SIGNIFICANT CHANGE UP (ref 80–100)
MONOCYTES # BLD AUTO: 0.62 K/UL — SIGNIFICANT CHANGE UP (ref 0–0.9)
MONOCYTES NFR BLD AUTO: 6.3 % — SIGNIFICANT CHANGE UP (ref 2–14)
NEUTROPHILS # BLD AUTO: 6.19 K/UL — SIGNIFICANT CHANGE UP (ref 1.8–7.4)
NEUTROPHILS NFR BLD AUTO: 62.7 % — SIGNIFICANT CHANGE UP (ref 43–77)
NRBC # FLD: 0 K/UL — SIGNIFICANT CHANGE UP (ref 0–0)
PLATELET # BLD AUTO: 156 K/UL — SIGNIFICANT CHANGE UP (ref 150–400)
PMV BLD: 11.8 FL — SIGNIFICANT CHANGE UP (ref 7–13)
RBC # BLD: 3.43 M/UL — LOW (ref 3.8–5.2)
RBC # FLD: 13.2 % — SIGNIFICANT CHANGE UP (ref 10.3–14.5)
WBC # BLD: 9.87 K/UL — SIGNIFICANT CHANGE UP (ref 3.8–10.5)
WBC # FLD AUTO: 9.87 K/UL — SIGNIFICANT CHANGE UP (ref 3.8–10.5)

## 2020-03-25 RX ORDER — ACETAMINOPHEN 500 MG
3 TABLET ORAL
Qty: 0 | Refills: 0 | DISCHARGE
Start: 2020-03-25

## 2020-03-25 RX ORDER — ALBUTEROL 90 UG/1
2 AEROSOL, METERED ORAL EVERY 6 HOURS
Refills: 0 | Status: DISCONTINUED | OUTPATIENT
Start: 2020-03-25 | End: 2020-03-25

## 2020-03-25 RX ORDER — FOLIC ACID 0.8 MG
1 TABLET ORAL
Qty: 0 | Refills: 0 | DISCHARGE

## 2020-03-25 RX ORDER — IBUPROFEN 200 MG
1 TABLET ORAL
Qty: 0 | Refills: 0 | DISCHARGE
Start: 2020-03-25

## 2020-03-25 RX ADMIN — Medication 600 MILLIGRAM(S): at 05:27

## 2020-03-25 RX ADMIN — FAMOTIDINE 20 MILLIGRAM(S): 10 INJECTION INTRAVENOUS at 13:38

## 2020-03-25 RX ADMIN — Medication 600 MILLIGRAM(S): at 06:25

## 2020-03-25 RX ADMIN — Medication 1 TABLET(S): at 13:38

## 2020-03-25 RX ADMIN — Medication 600 MILLIGRAM(S): at 14:19

## 2020-03-25 RX ADMIN — Medication 600 MILLIGRAM(S): at 13:38

## 2020-03-25 NOTE — PROGRESS NOTE ADULT - SUBJECTIVE AND OBJECTIVE BOX
OB Progress Note:  PPD#2    S: 35yo  PPD#2 s/p .  .  IPT s/p Amp/Gent/Tylenol.  History of childhood asthma.  Patient feels well. Pain is well controlled. She is tolerating a regular diet and passing flatus. She is voiding spontaneously, and ambulating without difficulty. Denies chest pain. Denies lightheadedness/dizziness. Denies N/V.  Patient complaints of intermittent shortness of breath when walking to NICU and sitting up in certain positions.      O:  Vitals:   Vital Signs Last 24 Hrs  T(C): 36.7 (25 Mar 2020 05:54), Max: 36.8 (24 Mar 2020 10:00)  T(F): 98.1 (25 Mar 2020 05:54), Max: 98.3 (24 Mar 2020 10:00)  HR: 55 (25 Mar 2020 06:38) (44 - 62)  BP: 122/73 (25 Mar 2020 05:54) (113/59 - 122/73)  BP(mean): --  RR: 18 (25 Mar 2020 05:54) (16 - 18)  SpO2: 100% (25 Mar 2020 05:54) (100% - 100%)    MEDICATIONS  (STANDING):  acetaminophen   Tablet .. 975 milliGRAM(s) Oral <User Schedule>  diphtheria/tetanus/pertussis (acellular) Vaccine (ADAcel) 0.5 milliLiter(s) IntraMuscular once  famotidine    Tablet 20 milliGRAM(s) Oral daily  ibuprofen  Tablet. 600 milliGRAM(s) Oral every 6 hours  prenatal multivitamin 1 Tablet(s) Oral daily  sodium chloride 0.9% lock flush 3 milliLiter(s) IV Push every 8 hours    MEDICATIONS  (PRN):  ALBUTerol    90 MICROgram(s) HFA Inhaler 2 Puff(s) Inhalation every 6 hours PRN Shortness of Breath and/or Wheezing  benzocaine 20%/menthol 0.5% Spray 1 Spray(s) Topical every 6 hours PRN for Perineal discomfort  dibucaine 1% Ointment 1 Application(s) Topical every 6 hours PRN Perineal discomfort  diphenhydrAMINE 25 milliGRAM(s) Oral every 6 hours PRN Pruritus  glycerin Suppository - Adult 1 Suppository(s) Rectal at bedtime PRN Constipation  hydrocortisone 1% Cream 1 Application(s) Topical every 6 hours PRN Moderate Pain (4-6)  lanolin Ointment 1 Application(s) Topical every 6 hours PRN nipple soreness  magnesium hydroxide Suspension 30 milliLiter(s) Oral two times a day PRN Constipation  oxyCODONE    IR 5 milliGRAM(s) Oral every 3 hours PRN Moderate to Severe Pain (4-10)  oxyCODONE    IR 5 milliGRAM(s) Oral once PRN Moderate to Severe Pain (4-10)  pramoxine 1%/zinc 5% Cream 1 Application(s) Topical every 4 hours PRN Moderate Pain (4-6)  simethicone 80 milliGRAM(s) Chew every 4 hours PRN Gas  witch hazel Pads 1 Application(s) Topical every 4 hours PRN Perineal discomfort      Labs:  Blood type: A Positive  Rubella IgG: RPR: Negative                          12.4   8.89 >-----------< 141<L>    (  @ 18:03 )             35.3      Physical Exam:  General: NAD  Heart: RRR  Lungs: CTABL  Abdomen: soft, non-tender, non-distended, fundus firm  Vaginal: Lochia wnl  Extremities: No erythema/edema    A/P: 35yo PPD#2 s/p .  Patient is stable and doing well post-partum.  Complains of intermittent SOB however vitals stable and lungs clear.    - Pain well controlled, continue current pain regimen  - Increase ambulation, SCDs when not ambulating  - Continue regular diet  - CBC in AM since patient complains of intermittent shortness of breath with ambulation.    - Discharge planning     Whitney ALVAREZ

## 2020-03-28 LAB
CULTURE RESULTS: SIGNIFICANT CHANGE UP
CULTURE RESULTS: SIGNIFICANT CHANGE UP
SPECIMEN SOURCE: SIGNIFICANT CHANGE UP
SPECIMEN SOURCE: SIGNIFICANT CHANGE UP
SURGICAL PATHOLOGY STUDY: SIGNIFICANT CHANGE UP

## 2021-04-13 ENCOUNTER — TRANSCRIPTION ENCOUNTER (OUTPATIENT)
Age: 36
End: 2021-04-13

## 2021-05-18 ENCOUNTER — TRANSCRIPTION ENCOUNTER (OUTPATIENT)
Age: 36
End: 2021-05-18

## 2021-06-16 ENCOUNTER — TRANSCRIPTION ENCOUNTER (OUTPATIENT)
Age: 36
End: 2021-06-16

## 2021-06-20 NOTE — OB RN TRIAGE NOTE - NS_ACCOMPAINEDBY_OBGYN_ALL_OB
Letter by Cammy Bui MD at      Author: Cammy Bui MD Service: -- Author Type: --    Filed:  Encounter Date: 6/22/2020 Status: (Other)         June 22, 2020     Patient: Mary Kay Tejada   YOB: 1951   Date of Visit: 6/22/2020       To Whom it May Concern:  [facility]:  Telephone # [phone]    Mary Kay Tejada was seen in my clinic on 6/22/2020.  She uses her oxygen at home, but also during the day with activity.  She would like to have a portable oxygen concentrator for travel purposes.  Please provide this for patient.      Diagnosis:  J44.9 COPD - oxygen dependent    If you have any questions or concerns, please don't hesitate to call.    Sincerely,         Electronically signed by Cammy Bui MD        Spouse

## 2021-07-19 ENCOUNTER — RESULT REVIEW (OUTPATIENT)
Age: 36
End: 2021-07-19

## 2021-09-16 PROBLEM — O03.9 COMPLETE OR UNSPECIFIED SPONTANEOUS ABORTION WITHOUT COMPLICATION: Chronic | Status: ACTIVE | Noted: 2020-03-22

## 2021-10-11 ENCOUNTER — APPOINTMENT (OUTPATIENT)
Dept: PEDIATRIC MEDICAL GENETICS | Facility: CLINIC | Age: 36
End: 2021-10-11

## 2021-10-19 ENCOUNTER — ASOB RESULT (OUTPATIENT)
Age: 36
End: 2021-10-19

## 2021-10-19 ENCOUNTER — APPOINTMENT (OUTPATIENT)
Dept: ANTEPARTUM | Facility: CLINIC | Age: 36
End: 2021-10-19
Payer: COMMERCIAL

## 2021-10-19 PROCEDURE — 36416 COLLJ CAPILLARY BLOOD SPEC: CPT

## 2021-10-19 PROCEDURE — 76813 OB US NUCHAL MEAS 1 GEST: CPT

## 2021-10-19 PROCEDURE — 76801 OB US < 14 WKS SINGLE FETUS: CPT

## 2021-12-10 ENCOUNTER — APPOINTMENT (OUTPATIENT)
Dept: ANTEPARTUM | Facility: CLINIC | Age: 36
End: 2021-12-10
Payer: COMMERCIAL

## 2021-12-10 ENCOUNTER — ASOB RESULT (OUTPATIENT)
Age: 36
End: 2021-12-10

## 2021-12-10 PROCEDURE — 76811 OB US DETAILED SNGL FETUS: CPT

## 2021-12-14 ENCOUNTER — APPOINTMENT (OUTPATIENT)
Dept: ANTEPARTUM | Facility: CLINIC | Age: 36
End: 2021-12-14

## 2022-02-14 NOTE — OB PROVIDER H&P - NSPREVIOUSINDUCEDTERMINATIONS_OBGYN_ALL_OB_NU
Anesthesia Start and Stop Event Times     Date Time Event    2/14/2022 1107 Ready for Procedure     1114 Anesthesia Start     1229 Anesthesia Stop        Responsible Staff  02/14/22    Name Role Begin End    Min FRIDA Elias M.D. Anesth 1114 1229        Preop Diagnosis (Free Text):  Pre-op Diagnosis     ACUTE CHOLECYSTITIS WITH OBSTRUCTION        Preop Diagnosis (Codes):    Premium Reason  C. Post-1st,2nd,3rd,OB,RTC    Comments:                                                                       
0

## 2022-05-02 ENCOUNTER — TRANSCRIPTION ENCOUNTER (OUTPATIENT)
Age: 37
End: 2022-05-02

## 2022-05-03 ENCOUNTER — INPATIENT (INPATIENT)
Facility: HOSPITAL | Age: 37
LOS: 2 days | Discharge: HOME | End: 2022-05-06
Attending: OBSTETRICS & GYNECOLOGY | Admitting: OBSTETRICS & GYNECOLOGY

## 2022-05-03 ENCOUNTER — TRANSCRIPTION ENCOUNTER (OUTPATIENT)
Age: 37
End: 2022-05-03

## 2022-05-03 VITALS
RESPIRATION RATE: 16 BRPM | TEMPERATURE: 99 F | HEART RATE: 92 BPM | SYSTOLIC BLOOD PRESSURE: 120 MMHG | DIASTOLIC BLOOD PRESSURE: 70 MMHG

## 2022-05-03 DIAGNOSIS — Z3A.00 WEEKS OF GESTATION OF PREGNANCY NOT SPECIFIED: ICD-10-CM

## 2022-05-03 DIAGNOSIS — O26.899 OTHER SPECIFIED PREGNANCY RELATED CONDITIONS, UNSPECIFIED TRIMESTER: ICD-10-CM

## 2022-05-03 DIAGNOSIS — Z98.890 OTHER SPECIFIED POSTPROCEDURAL STATES: Chronic | ICD-10-CM

## 2022-05-03 LAB
BASOPHILS # BLD AUTO: 0.03 K/UL — SIGNIFICANT CHANGE UP (ref 0–0.2)
BASOPHILS NFR BLD AUTO: 0.4 % — SIGNIFICANT CHANGE UP (ref 0–2)
BLD GP AB SCN SERPL QL: NEGATIVE — SIGNIFICANT CHANGE UP
EOSINOPHIL # BLD AUTO: 0.06 K/UL — SIGNIFICANT CHANGE UP (ref 0–0.5)
EOSINOPHIL NFR BLD AUTO: 0.8 % — SIGNIFICANT CHANGE UP (ref 0–6)
HCT VFR BLD CALC: 37.3 % — SIGNIFICANT CHANGE UP (ref 34.5–45)
HGB BLD-MCNC: 12.8 G/DL — SIGNIFICANT CHANGE UP (ref 11.5–15.5)
HIV 1+2 AB+HIV1 P24 AG SERPL QL IA: SIGNIFICANT CHANGE UP
IANC: 4.92 K/UL — SIGNIFICANT CHANGE UP (ref 1.8–7.4)
IMM GRANULOCYTES NFR BLD AUTO: 0.4 % — SIGNIFICANT CHANGE UP (ref 0–1.5)
LYMPHOCYTES # BLD AUTO: 1.58 K/UL — SIGNIFICANT CHANGE UP (ref 1–3.3)
LYMPHOCYTES # BLD AUTO: 22 % — SIGNIFICANT CHANGE UP (ref 13–44)
MCHC RBC-ENTMCNC: 33 PG — SIGNIFICANT CHANGE UP (ref 27–34)
MCHC RBC-ENTMCNC: 34.3 GM/DL — SIGNIFICANT CHANGE UP (ref 32–36)
MCV RBC AUTO: 96.1 FL — SIGNIFICANT CHANGE UP (ref 80–100)
MONOCYTES # BLD AUTO: 0.56 K/UL — SIGNIFICANT CHANGE UP (ref 0–0.9)
MONOCYTES NFR BLD AUTO: 7.8 % — SIGNIFICANT CHANGE UP (ref 2–14)
NEUTROPHILS # BLD AUTO: 4.92 K/UL — SIGNIFICANT CHANGE UP (ref 1.8–7.4)
NEUTROPHILS NFR BLD AUTO: 68.6 % — SIGNIFICANT CHANGE UP (ref 43–77)
NRBC # BLD: 0 /100 WBCS — SIGNIFICANT CHANGE UP
NRBC # FLD: 0 K/UL — SIGNIFICANT CHANGE UP
PLATELET # BLD AUTO: 161 K/UL — SIGNIFICANT CHANGE UP (ref 150–400)
RBC # BLD: 3.88 M/UL — SIGNIFICANT CHANGE UP (ref 3.8–5.2)
RBC # FLD: 13.7 % — SIGNIFICANT CHANGE UP (ref 10.3–14.5)
RH IG SCN BLD-IMP: POSITIVE — SIGNIFICANT CHANGE UP
SARS-COV-2 RNA SPEC QL NAA+PROBE: SIGNIFICANT CHANGE UP
WBC # BLD: 7.18 K/UL — SIGNIFICANT CHANGE UP (ref 3.8–10.5)
WBC # FLD AUTO: 7.18 K/UL — SIGNIFICANT CHANGE UP (ref 3.8–10.5)

## 2022-05-03 DEVICE — SURGICEL NU-KNIT 6 X 9": Type: IMPLANTABLE DEVICE | Status: FUNCTIONAL

## 2022-05-03 RX ORDER — SIMETHICONE 80 MG/1
80 TABLET, CHEWABLE ORAL EVERY 4 HOURS
Refills: 0 | Status: DISCONTINUED | OUTPATIENT
Start: 2022-05-03 | End: 2022-05-06

## 2022-05-03 RX ORDER — OXYTOCIN 10 UNIT/ML
333.33 VIAL (ML) INJECTION
Qty: 20 | Refills: 0 | Status: DISCONTINUED | OUTPATIENT
Start: 2022-05-03 | End: 2022-05-03

## 2022-05-03 RX ORDER — NALOXONE HYDROCHLORIDE 4 MG/.1ML
0.1 SPRAY NASAL
Refills: 0 | Status: DISCONTINUED | OUTPATIENT
Start: 2022-05-04 | End: 2022-05-06

## 2022-05-03 RX ORDER — CITRIC ACID/SODIUM CITRATE 300-500 MG
30 SOLUTION, ORAL ORAL ONCE
Refills: 0 | Status: DISCONTINUED | OUTPATIENT
Start: 2022-05-03 | End: 2022-05-03

## 2022-05-03 RX ORDER — LANOLIN
1 OINTMENT (GRAM) TOPICAL EVERY 6 HOURS
Refills: 0 | Status: DISCONTINUED | OUTPATIENT
Start: 2022-05-03 | End: 2022-05-06

## 2022-05-03 RX ORDER — SODIUM CHLORIDE 9 MG/ML
1000 INJECTION, SOLUTION INTRAVENOUS
Refills: 0 | Status: DISCONTINUED | OUTPATIENT
Start: 2022-05-03 | End: 2022-05-03

## 2022-05-03 RX ORDER — MAGNESIUM HYDROXIDE 400 MG/1
30 TABLET, CHEWABLE ORAL
Refills: 0 | Status: DISCONTINUED | OUTPATIENT
Start: 2022-05-03 | End: 2022-05-06

## 2022-05-03 RX ORDER — SODIUM CHLORIDE 9 MG/ML
1000 INJECTION, SOLUTION INTRAVENOUS ONCE
Refills: 0 | Status: DISCONTINUED | OUTPATIENT
Start: 2022-05-03 | End: 2022-05-03

## 2022-05-03 RX ORDER — OXYTOCIN 10 UNIT/ML
333.33 VIAL (ML) INJECTION
Qty: 20 | Refills: 0 | Status: DISCONTINUED | OUTPATIENT
Start: 2022-05-03 | End: 2022-05-06

## 2022-05-03 RX ORDER — HEPARIN SODIUM 5000 [USP'U]/ML
5000 INJECTION INTRAVENOUS; SUBCUTANEOUS EVERY 12 HOURS
Refills: 0 | Status: DISCONTINUED | OUTPATIENT
Start: 2022-05-03 | End: 2022-05-06

## 2022-05-03 RX ORDER — OXYCODONE HYDROCHLORIDE 5 MG/1
5 TABLET ORAL
Refills: 0 | Status: DISCONTINUED | OUTPATIENT
Start: 2022-05-03 | End: 2022-05-06

## 2022-05-03 RX ORDER — CITRIC ACID/SODIUM CITRATE 300-500 MG
30 SOLUTION, ORAL ORAL ONCE
Refills: 0 | Status: COMPLETED | OUTPATIENT
Start: 2022-05-03 | End: 2022-05-03

## 2022-05-03 RX ORDER — TETANUS TOXOID, REDUCED DIPHTHERIA TOXOID AND ACELLULAR PERTUSSIS VACCINE, ADSORBED 5; 2.5; 8; 8; 2.5 [IU]/.5ML; [IU]/.5ML; UG/.5ML; UG/.5ML; UG/.5ML
0.5 SUSPENSION INTRAMUSCULAR ONCE
Refills: 0 | Status: DISCONTINUED | OUTPATIENT
Start: 2022-05-03 | End: 2022-05-06

## 2022-05-03 RX ORDER — KETOROLAC TROMETHAMINE 30 MG/ML
30 SYRINGE (ML) INJECTION EVERY 6 HOURS
Refills: 0 | Status: DISCONTINUED | OUTPATIENT
Start: 2022-05-03 | End: 2022-05-05

## 2022-05-03 RX ORDER — IBUPROFEN 200 MG
600 TABLET ORAL EVERY 6 HOURS
Refills: 0 | Status: COMPLETED | OUTPATIENT
Start: 2022-05-03 | End: 2023-04-01

## 2022-05-03 RX ORDER — FAMOTIDINE 10 MG/ML
20 INJECTION INTRAVENOUS ONCE
Refills: 0 | Status: DISCONTINUED | OUTPATIENT
Start: 2022-05-03 | End: 2022-05-03

## 2022-05-03 RX ORDER — OXYCODONE HYDROCHLORIDE 5 MG/1
10 TABLET ORAL
Refills: 0 | Status: DISCONTINUED | OUTPATIENT
Start: 2022-05-04 | End: 2022-05-06

## 2022-05-03 RX ORDER — OXYCODONE HYDROCHLORIDE 5 MG/1
5 TABLET ORAL
Refills: 0 | Status: DISCONTINUED | OUTPATIENT
Start: 2022-05-04 | End: 2022-05-06

## 2022-05-03 RX ORDER — SODIUM CHLORIDE 9 MG/ML
1000 INJECTION, SOLUTION INTRAVENOUS
Refills: 0 | Status: DISCONTINUED | OUTPATIENT
Start: 2022-05-03 | End: 2022-05-06

## 2022-05-03 RX ORDER — DIPHENHYDRAMINE HCL 50 MG
25 CAPSULE ORAL EVERY 6 HOURS
Refills: 0 | Status: DISCONTINUED | OUTPATIENT
Start: 2022-05-03 | End: 2022-05-06

## 2022-05-03 RX ORDER — ONDANSETRON 8 MG/1
4 TABLET, FILM COATED ORAL EVERY 6 HOURS
Refills: 0 | Status: DISCONTINUED | OUTPATIENT
Start: 2022-05-04 | End: 2022-05-06

## 2022-05-03 RX ORDER — ACETAMINOPHEN 500 MG
975 TABLET ORAL
Refills: 0 | Status: DISCONTINUED | OUTPATIENT
Start: 2022-05-03 | End: 2022-05-06

## 2022-05-03 RX ORDER — OXYCODONE HYDROCHLORIDE 5 MG/1
5 TABLET ORAL ONCE
Refills: 0 | Status: DISCONTINUED | OUTPATIENT
Start: 2022-05-03 | End: 2022-05-06

## 2022-05-03 RX ORDER — FAMOTIDINE 10 MG/ML
20 INJECTION INTRAVENOUS ONCE
Refills: 0 | Status: COMPLETED | OUTPATIENT
Start: 2022-05-03 | End: 2022-05-03

## 2022-05-03 RX ORDER — HYDROMORPHONE HYDROCHLORIDE 2 MG/ML
1 INJECTION INTRAMUSCULAR; INTRAVENOUS; SUBCUTANEOUS
Refills: 0 | Status: DISCONTINUED | OUTPATIENT
Start: 2022-05-04 | End: 2022-05-06

## 2022-05-03 RX ADMIN — FAMOTIDINE 20 MILLIGRAM(S): 10 INJECTION INTRAVENOUS at 21:01

## 2022-05-03 RX ADMIN — SODIUM CHLORIDE 75 MILLILITER(S): 9 INJECTION, SOLUTION INTRAVENOUS at 23:05

## 2022-05-03 RX ADMIN — SODIUM CHLORIDE 125 MILLILITER(S): 9 INJECTION, SOLUTION INTRAVENOUS at 20:00

## 2022-05-03 RX ADMIN — Medication 30 MILLILITER(S): at 21:08

## 2022-05-03 RX ADMIN — SODIUM CHLORIDE 125 MILLILITER(S): 9 INJECTION, SOLUTION INTRAVENOUS at 21:00

## 2022-05-03 RX ADMIN — Medication 1000 MILLIUNIT(S)/MIN: at 23:05

## 2022-05-03 NOTE — OB RN INTRAOPERATIVE NOTE - NSSELHIDDEN_OBGYN_ALL_OB_FT
[NS_DeliveryAttending1_OBGYN_ALL_OB_FT:[id number]],[NS_DeliveryRN_OBGYN_ALL_OB_FT:[id number]],[NS_CirculateRN2_OBGYN_ALL_OB_FT:[id number]]

## 2022-05-03 NOTE — OB PROVIDER H&P - ASSESSMENT
36 year old female P1 at 40.1 weeks decreased FM  NRNST at office    LGA EFW  4848g   NST in triage  reactive with contractions q 2-3 minutes     VE 2-3/70/-3   pt seen and counselled by DR Huston  mode of delivery d/w patient and risks/benefits     pt opting for CS     case also d/w Dr Heaton  who is covering now   pt admitted for Primary CS for LGA   Covid 19 testing   labs sent   1925p  Huddle called in triage  pt is belen and will likely not await NPO status    per Dr heaton and Dr Leal of anesthesia aware   labs sent stat     [facility]

## 2022-05-03 NOTE — OB RN DELIVERY SUMMARY - NSSELHIDDEN_OBGYN_ALL_OB_FT
[NS_DeliveryAttending1_OBGYN_ALL_OB_FT:[id number]=],[NS_DeliveryRN_OBGYN_ALL_OB_FT:[id number]=],[NS_CirculateRN2_OBGYN_ALL_OB_FT:[id number]=]

## 2022-05-03 NOTE — OB PROVIDER TRIAGE NOTE - NS_CTXBYPALP_OBGYN_ALL_OB
****Call the office with any problems including but not limited to heavy vaginal bleeding, fevers, severe abdominal pain, inability to eat/drink/urinate  **** Nothing in the vagina x2 weeks-( No sex, tampons, douching )  *****You may shower as usual but no hot tubs, bath tubs, swimming pools x2 weeks. Mild

## 2022-05-03 NOTE — OB NEONATOLOGY/PEDIATRICIAN DELIVERY SUMMARY - NSPEDSNEONOTESA_OBGYN_ALL_OB_FT
Baby is a 40.1 wk GA male born to a 35 y/o  mother via C/S for LGA. Maternal history uncomplicated. Prenatal history uncomplicated. Maternal BT A+. PNL neg, NR, and immune. GBS positive, no rupture no labor. AROM at delivery on 5/3, terminal meconium. Baby born vigorous and crying spontaneously. WDSS. Apgars 6/8/9. Mom plans to breastfeed, would like hepB vaccine and circumcision. COVID status pending.    Physical Exam (Post-Delivery)  Gen: NAD; well-appearing  HEENT: NC/AT; anterior fontanelle open and flat; ears and nose clinically patent, normally set; no tags, no cleft palate appreciated  Skin: pink, warm, well-perfused, no rash  Resp: non-labored breathing  Abd: soft, NT/ND; no masses appreciated, umbilical cord with 3 vessels  Extremities: moving all extremities, no crepitus; hips negative O/B  MSK: no clavicular fracture appreciated  : Pierre I; +penile torsion; anus patent  Back: no sacral dimple  Neuro: +clark, +babinski, grasp, good tone throughout

## 2022-05-03 NOTE — OB PROVIDER H&P - NSHPPHYSICALEXAM_GEN_ALL_CORE
pt seen and examined   ICU Vital Signs Last 24 Hrs  T(C): 37.1 (03 May 2022 18:04), Max: 37.1 (03 May 2022 17:44)  T(F): 98.78 (03 May 2022 18:04), Max: 98.8 (03 May 2022 17:44)  HR: 79 (03 May 2022 18:43) (79 - 92)  BP: 119/74 (03 May 2022 18:43) (119/74 - 120/81)  RR: 16 (03 May 2022 17:44) (16 - 16)  SpO2: --  pt alert OX3   lungs clear   heart s1 s2  abd soft gravid non tender   VE 2-3/70/-3  vtx   examined by Dr Weaver   placed on [facility]

## 2022-05-03 NOTE — DISCHARGE NOTE OB - CARE PROVIDER_API CALL
actual Milla Heaton (DO)  Obstetrics and Gynecology  [address] [place] [address]  Phone: [phone]  Fax: [phone]  Established Patient  Follow Up Time: 2 weeks

## 2022-05-03 NOTE — DISCHARGE NOTE OB - PERSISTENT HEADACHE, BLURRED VISION
Bill For Surgical Tray: no Expected Date Of Service: 08/25/2021 Performing Laboratory: 0 Billing Type: Third-Party Bill Statement Selected

## 2022-05-03 NOTE — OB RN DELIVERY SUMMARY - NS_LABORMEDS_OBGYN_ALL_OB
Arterial Line Placement    Procedure Date: 7/17/2020 5:35 PM     Indication: BP monitoring    Performed by: Dr. Moreno    Assistant: Andriy Navarro PGY1      Description of Procedure:    After palpation of the bilateral radial arteries the left radial artery was selected and prepped was prepped and draped in the usual sterile fashion. A thorough time out was performed after which a 20-gauge arterial line was inserted until a flash of bright red blood was appreciated. At this point the guide wire was advanced into the vessel without meeting resistance. The catheter was then inserted without resistance and the needle and guide wire were withdrawn. The catheter was sutured in place. The arterial line was then dressed with Tegaderm. Blood loss was minimal and the patient tolerated the procedure well.     Andriy Navarro PGY1  Surgery    
Central Venous Line Placement    Procedure Date: 7/17/2020 9:31 PM     Indication: Central Venous Access    Performed by: Dr. Moreno    Assistant: Andriy Navarro PGY2      Description of Procedure:    The right internal jugular vein was assessed with ultrasound before the right neck was prepped and draped in the usual sterile fashion. A thorough time out was performed after which venous catheterization was achieved under ultrasound guidance in the usual fashion. A 20cm 7Fr multilumen catheter was then inserted without meeting resistance and was sutured in place. The access site was then dressed while maintaining sterility. The site was assessed for hemostasis. Blood loss was minimal and the patient tolerated the procedure well.     Telemetry was monitored for the presence of new arrhythmias, which were not found.  A post procedure CXR showed no evidence of pneumothorax. The catheter tip was seen sitting above the cavo-atrial junction.       Andriy Navarro PGY2  Surgery    
None

## 2022-05-03 NOTE — OB RN TRIAGE NOTE - FALL HARM RISK - UNIVERSAL INTERVENTIONS
Bed in lowest position, wheels locked, appropriate side rails in place/Call bell, personal items and telephone in reach/Instruct patient to call for assistance before getting out of bed or chair/Non-slip footwear when patient is out of bed/[place] to call system/Physically safe environment - no spills, clutter or unnecessary equipment/Purposeful Proactive Rounding/Room/bathroom lighting operational, light cord in reach

## 2022-05-03 NOTE — DISCHARGE NOTE OB - MEDICATION SUMMARY - MEDICATIONS TO TAKE
I will START or STAY ON the medications listed below when I get home from the hospital:    acetaminophen 325 mg oral tablet  -- 3 tab(s) by mouth every 6 hours, As Needed  -- Indication: For pain    ibuprofen 600 mg oral tablet  -- 1 tab(s) by mouth every 6 hours, As Needed  -- Indication: For pain    lanolin topical ointment  -- 1 application on skin every 6 hours, As needed, Sore Nipples  -- Indication: For breastfeeding

## 2022-05-03 NOTE — DISCHARGE NOTE OB - CARE PLAN
1 Principal Discharge DX:	 delivery delivered  Assessment and plan of treatment:	recovery  RTO 2 weeks

## 2022-05-03 NOTE — DISCHARGE NOTE OB - PATIENT PORTAL LINK FT
You can access the FollowMyHealth Patient Portal offered by [facility] by registering at the following website: [email or web address]. By joining [employer]’s FollowMyHealth portal, you will also be able to view your health information using other applications (apps) compatible with our system.

## 2022-05-03 NOTE — OB PROVIDER DELIVERY SUMMARY - NSPROVIDERDELIVERYNOTE_OBGYN_ALL_OB_FT
LSTCS performed at patient request for suspected macrosomia EFW 4800 gms   delivery of OP male , APGARS 6/8  R sided extension laterally and inferiorly of hysterotomy  hysterotomy repaired in 1 layer using 1 vicryl  lateral extension repaired using 1 vicryl  inferior extension repaired using 2 caprosyn  good hemostasis achieved  TXA and additional 20 U pitocin given for LESLEY atony  remainder of closure uncomplicated    ml   ml  transferred to recovery in stable condition

## 2022-05-03 NOTE — OB PROVIDER TRIAGE NOTE - HISTORY OF PRESENT ILLNESS
36 year old female  at 40.1 weeks gestation who presents from office noted with NRNST and pt c/o Decreased FM for past 2 days  decreased in frequency and strength of movements    stated had scan today in office and BPP  efw 4848g   denied any GDM  stated had prior  9.2 #  without complication.     med hx denied   surg hx DVC x1   NKDA   OB hx  3/23/20  40.3 weeks  9.2#( 4139G)    SAB x2    gyn hx denied

## 2022-05-03 NOTE — OB PROVIDER H&P - NSSCHADMISSION_OBGYN_A_OB

## 2022-05-03 NOTE — OB PROVIDER TRIAGE NOTE - NS_TRIAGEEVALUATION_OBGYN_ALL_OB_DT
03-May-2022 17:50 Modified Advancement Flap Text: The defect edges were debeveled with a #15 scalpel blade.  Given the location of the defect, shape of the defect and the proximity to free margins a modified advancement flap was deemed most appropriate.  Using a sterile surgical marker, an appropriate advancement flap was drawn incorporating the defect and placing the expected incisions within the relaxed skin tension lines where possible.    The area thus outlined was incised deep to adipose tissue with a #15 scalpel blade.  The skin margins were undermined to an appropriate distance in all directions utilizing iris scissors.

## 2022-05-03 NOTE — OB PROVIDER TRIAGE NOTE - NSOBPROVIDERNOTE_OBGYN_ALL_OB_FT
36 year old female P1 at 40.1 weeks decreased FM  NRNST at office    LGA EFW  4848g   NST in triage    pt seen and counselled by DR Huston  mode of delivery d/w patient and risks/benefits     pt opting for 36 year old female P1 at 40.1 weeks decreased FM  NRNST at office    LGA EFW  4848g   NST in triage  reactive with contractions q 2-3 minutes     VE 2-3/70/-3   pt seen and counselled by DR Huston  mode of delivery d/w patient and risks/benefits     pt opting for CS     case also d/w Dr Heaton  who is covering now   pt admitted for Primary CS for LGA   Covid 19 testing   labs sent   1925p  [place] called in triage  pt is belen and will likely not await NPO status    per Dr heaton and Dr Leal of anesthesia aware   labs sent stat     [facility]

## 2022-05-03 NOTE — OB PROVIDER TRIAGE NOTE - NSHPPHYSICALEXAM_GEN_ALL_CORE
pt seen and examined   ICU Vital Signs Last 24 Hrs  T(C): 37.1 (03 May 2022 18:04), Max: 37.1 (03 May 2022 17:44)  T(F): 98.78 (03 May 2022 18:04), Max: 98.8 (03 May 2022 17:44)  HR: 79 (03 May 2022 18:43) (79 - 92)  BP: 119/74 (03 May 2022 18:43) (119/74 - 120/81)  BP(mean): --  ABP: --  ABP(mean): --  RR: 16 (03 May 2022 17:44) (16 - 16)  SpO2: --  pt alert OX3   lungs clear   heart s1 s2  abd soft gravid non tender   VE 2-3/70/-3  vtx   examined by Dr Weaver   placed on [facility] pt seen and examined   ICU Vital Signs Last 24 Hrs  T(C): 37.1 (03 May 2022 18:04), Max: 37.1 (03 May 2022 17:44)  T(F): 98.78 (03 May 2022 18:04), Max: 98.8 (03 May 2022 17:44)  HR: 79 (03 May 2022 18:43) (79 - 92)  BP: 119/74 (03 May 2022 18:43) (119/74 - 120/81)  RR: 16 (03 May 2022 17:44) (16 - 16)  SpO2: --  pt alert OX3   lungs clear   heart s1 s2  abd soft gravid non tender   VE 2-3/70/-3  vtx   examined by Dr Weaver   placed on [facility]

## 2022-05-03 NOTE — OB RN DELIVERY SUMMARY - NS_SEPSISRSKCALC_OBGYN_ALL_OB_FT
EOS calculated successfully. EOS Risk Factor: 0.5/1000 live births ([facility] national incidence); GA=40w1d; Temp=99.5; ROM=0; GBS='Positive'; Antibiotics='No antibiotics or any antibiotics < 2 hrs prior to birth'

## 2022-05-04 LAB
COVID-19 SPIKE DOMAIN AB INTERP: POSITIVE
COVID-19 SPIKE DOMAIN ANTIBODY RESULT: >250 U/ML — HIGH
HCT VFR BLD CALC: 34.8 % — SIGNIFICANT CHANGE UP (ref 34.5–45)
HGB BLD-MCNC: 12 G/DL — SIGNIFICANT CHANGE UP (ref 11.5–15.5)
MCHC RBC-ENTMCNC: 33.6 PG — SIGNIFICANT CHANGE UP (ref 27–34)
MCHC RBC-ENTMCNC: 34.5 GM/DL — SIGNIFICANT CHANGE UP (ref 32–36)
MCV RBC AUTO: 97.5 FL — SIGNIFICANT CHANGE UP (ref 80–100)
NRBC # BLD: 0 /100 WBCS — SIGNIFICANT CHANGE UP
NRBC # FLD: 0 K/UL — SIGNIFICANT CHANGE UP
PLATELET # BLD AUTO: 145 K/UL — LOW (ref 150–400)
RBC # BLD: 3.57 M/UL — LOW (ref 3.8–5.2)
RBC # FLD: 13.5 % — SIGNIFICANT CHANGE UP (ref 10.3–14.5)
RUBV IGG SER-ACNC: 2.9 INDEX — SIGNIFICANT CHANGE UP
RUBV IGG SER-IMP: POSITIVE — SIGNIFICANT CHANGE UP
SARS-COV-2 IGG+IGM SERPL QL IA: >250 U/ML — HIGH
SARS-COV-2 IGG+IGM SERPL QL IA: POSITIVE
T PALLIDUM AB TITR SER: NEGATIVE — SIGNIFICANT CHANGE UP
WBC # BLD: 12.37 K/UL — HIGH (ref 3.8–10.5)
WBC # FLD AUTO: 12.37 K/UL — HIGH (ref 3.8–10.5)

## 2022-05-04 RX ORDER — IBUPROFEN 200 MG
600 TABLET ORAL EVERY 6 HOURS
Refills: 0 | Status: DISCONTINUED | OUTPATIENT
Start: 2022-05-04 | End: 2022-05-06

## 2022-05-04 RX ORDER — ACETAMINOPHEN 500 MG
1000 TABLET ORAL ONCE
Refills: 0 | Status: COMPLETED | OUTPATIENT
Start: 2022-05-04 | End: 2022-05-04

## 2022-05-04 RX ADMIN — Medication 975 MILLIGRAM(S): at 15:14

## 2022-05-04 RX ADMIN — HEPARIN SODIUM 5000 UNIT(S): 5000 INJECTION INTRAVENOUS; SUBCUTANEOUS at 18:10

## 2022-05-04 RX ADMIN — Medication 30 MILLIGRAM(S): at 18:58

## 2022-05-04 RX ADMIN — Medication 1000 MILLIGRAM(S): at 02:30

## 2022-05-04 RX ADMIN — OXYCODONE HYDROCHLORIDE 5 MILLIGRAM(S): 5 TABLET ORAL at 16:10

## 2022-05-04 RX ADMIN — Medication 975 MILLIGRAM(S): at 21:03

## 2022-05-04 RX ADMIN — Medication 975 MILLIGRAM(S): at 20:16

## 2022-05-04 RX ADMIN — Medication 975 MILLIGRAM(S): at 09:29

## 2022-05-04 RX ADMIN — SIMETHICONE 80 MILLIGRAM(S): 80 TABLET, CHEWABLE ORAL at 20:16

## 2022-05-04 RX ADMIN — OXYCODONE HYDROCHLORIDE 5 MILLIGRAM(S): 5 TABLET ORAL at 23:43

## 2022-05-04 RX ADMIN — Medication 975 MILLIGRAM(S): at 10:25

## 2022-05-04 RX ADMIN — HEPARIN SODIUM 5000 UNIT(S): 5000 INJECTION INTRAVENOUS; SUBCUTANEOUS at 05:41

## 2022-05-04 RX ADMIN — Medication 30 MILLIGRAM(S): at 11:58

## 2022-05-04 RX ADMIN — Medication 400 MILLIGRAM(S): at 01:15

## 2022-05-04 RX ADMIN — Medication 30 MILLIGRAM(S): at 05:40

## 2022-05-04 RX ADMIN — Medication 30 MILLIGRAM(S): at 06:30

## 2022-05-04 RX ADMIN — ONDANSETRON 4 MILLIGRAM(S): 8 TABLET, FILM COATED ORAL at 02:23

## 2022-05-04 RX ADMIN — Medication 30 MILLIGRAM(S): at 18:10

## 2022-05-04 RX ADMIN — Medication 600 MILLIGRAM(S): at 23:43

## 2022-05-04 RX ADMIN — OXYCODONE HYDROCHLORIDE 5 MILLIGRAM(S): 5 TABLET ORAL at 15:14

## 2022-05-04 RX ADMIN — Medication 975 MILLIGRAM(S): at 16:10

## 2022-05-04 RX ADMIN — SIMETHICONE 80 MILLIGRAM(S): 80 TABLET, CHEWABLE ORAL at 15:15

## 2022-05-04 RX ADMIN — Medication 30 MILLIGRAM(S): at 11:43

## 2022-05-04 NOTE — PACU DISCHARGE NOTE - NSPTMEETSDISCHCRITERIADT_GEN_A_CORE
We received a PA for Jyothi. I called Tyrone Yeh to confirm if patient has Medicare or Medicaid. I talked to Jelly Lee and she states he has Medicare and she states no PA is needed and there is no charge to patient and will get a 90 day supply ready for patient.
04-May-2022 02:48

## 2022-05-05 RX ADMIN — Medication 600 MILLIGRAM(S): at 23:30

## 2022-05-05 RX ADMIN — Medication 975 MILLIGRAM(S): at 15:24

## 2022-05-05 RX ADMIN — OXYCODONE HYDROCHLORIDE 5 MILLIGRAM(S): 5 TABLET ORAL at 11:53

## 2022-05-05 RX ADMIN — Medication 600 MILLIGRAM(S): at 19:15

## 2022-05-05 RX ADMIN — Medication 975 MILLIGRAM(S): at 20:31

## 2022-05-05 RX ADMIN — Medication 975 MILLIGRAM(S): at 08:46

## 2022-05-05 RX ADMIN — HEPARIN SODIUM 5000 UNIT(S): 5000 INJECTION INTRAVENOUS; SUBCUTANEOUS at 18:21

## 2022-05-05 RX ADMIN — Medication 975 MILLIGRAM(S): at 21:01

## 2022-05-05 RX ADMIN — SIMETHICONE 80 MILLIGRAM(S): 80 TABLET, CHEWABLE ORAL at 23:30

## 2022-05-05 RX ADMIN — Medication 600 MILLIGRAM(S): at 00:30

## 2022-05-05 RX ADMIN — Medication 600 MILLIGRAM(S): at 05:37

## 2022-05-05 RX ADMIN — Medication 975 MILLIGRAM(S): at 16:00

## 2022-05-05 RX ADMIN — HEPARIN SODIUM 5000 UNIT(S): 5000 INJECTION INTRAVENOUS; SUBCUTANEOUS at 05:50

## 2022-05-05 RX ADMIN — MAGNESIUM HYDROXIDE 30 MILLILITER(S): 400 TABLET, CHEWABLE ORAL at 08:45

## 2022-05-05 RX ADMIN — Medication 600 MILLIGRAM(S): at 12:50

## 2022-05-05 RX ADMIN — OXYCODONE HYDROCHLORIDE 5 MILLIGRAM(S): 5 TABLET ORAL at 00:30

## 2022-05-05 RX ADMIN — Medication 600 MILLIGRAM(S): at 18:15

## 2022-05-05 RX ADMIN — Medication 975 MILLIGRAM(S): at 02:50

## 2022-05-05 RX ADMIN — OXYCODONE HYDROCHLORIDE 5 MILLIGRAM(S): 5 TABLET ORAL at 12:50

## 2022-05-05 RX ADMIN — SIMETHICONE 80 MILLIGRAM(S): 80 TABLET, CHEWABLE ORAL at 18:14

## 2022-05-05 RX ADMIN — SIMETHICONE 80 MILLIGRAM(S): 80 TABLET, CHEWABLE ORAL at 08:45

## 2022-05-05 RX ADMIN — Medication 600 MILLIGRAM(S): at 11:52

## 2022-05-05 RX ADMIN — Medication 975 MILLIGRAM(S): at 09:40

## 2022-05-05 RX ADMIN — Medication 600 MILLIGRAM(S): at 06:25

## 2022-05-05 RX ADMIN — Medication 975 MILLIGRAM(S): at 02:00

## 2022-05-05 RX ADMIN — SIMETHICONE 80 MILLIGRAM(S): 80 TABLET, CHEWABLE ORAL at 05:38

## 2022-05-06 VITALS
HEART RATE: 76 BPM | DIASTOLIC BLOOD PRESSURE: 84 MMHG | RESPIRATION RATE: 17 BRPM | OXYGEN SATURATION: 100 % | SYSTOLIC BLOOD PRESSURE: 119 MMHG | TEMPERATURE: 98 F

## 2022-05-06 RX ORDER — IBUPROFEN 200 MG
1 TABLET ORAL
Qty: 0 | Refills: 0 | DISCHARGE
Start: 2022-05-06

## 2022-05-06 RX ORDER — ACETAMINOPHEN 500 MG
3 TABLET ORAL
Qty: 0 | Refills: 0 | DISCHARGE
Start: 2022-05-06

## 2022-05-06 RX ORDER — LANOLIN
1 OINTMENT (GRAM) TOPICAL
Qty: 0 | Refills: 0 | DISCHARGE
Start: 2022-05-06

## 2022-05-06 RX ADMIN — HEPARIN SODIUM 5000 UNIT(S): 5000 INJECTION INTRAVENOUS; SUBCUTANEOUS at 05:12

## 2022-05-06 RX ADMIN — Medication 975 MILLIGRAM(S): at 09:18

## 2022-05-06 RX ADMIN — Medication 600 MILLIGRAM(S): at 00:00

## 2022-05-06 RX ADMIN — Medication 600 MILLIGRAM(S): at 11:20

## 2022-05-06 RX ADMIN — Medication 600 MILLIGRAM(S): at 05:42

## 2022-05-06 RX ADMIN — Medication 600 MILLIGRAM(S): at 05:12

## 2022-05-06 RX ADMIN — Medication 975 MILLIGRAM(S): at 15:51

## 2022-05-06 RX ADMIN — Medication 975 MILLIGRAM(S): at 16:47

## 2022-05-06 RX ADMIN — Medication 975 MILLIGRAM(S): at 02:04

## 2022-05-06 RX ADMIN — Medication 600 MILLIGRAM(S): at 12:30

## 2022-05-06 RX ADMIN — Medication 975 MILLIGRAM(S): at 08:42

## 2022-05-06 RX ADMIN — Medication 975 MILLIGRAM(S): at 02:34

## 2022-05-06 NOTE — PROGRESS NOTE ADULT - ASSESSMENT
Assessment and Plan  POD #2 s/p P/C/S for macrosomia.    Her pain is well controlled. Reports having gas pain, medication already given. Tender to touch in abdominal area, more pain reported on left upper quadrant.   She is tolerating a regular diet and passing flatus.   Denies N/V. Denies CP/SOB/lightheadedness/dizziness.   She is ambulating without difficulty.   Voiding spontaneously.    Patient is stable and doing well post-operatively.    - Continue regular diet.  - Increase ambulation.  - Continue motrin, tylenol, oxycodone PRN for pain control.   -Encourage breastfeeding.  -Incisional care and PO instructions reviewed.     Discharge planning    Discussed with MD William Palmer  
A/P: 35yo POD#1 s/p pLTCS for macrosomia, QBL = 397.  Patient is stable and doing well post-operatively.    - Continue regular diet.  - Increase ambulation.  - Continue motrin, tylenol, oxycodone PRN for pain control.  - F/u AM CBC  - DTV ~2pm, f/u passage of void     Harika Mtz MD PGY1
Assessment and Plan  POD #3 s/p C/S.  Doing well and bonding with baby  Encourage ambulation.  Incisional care and PO instructions reviewed.  CPC.  Discharge home today  RTO 2 weeks for incision check/PRN
A/P: 35yo POD#1 s/p pLTCS for macrosomia, QBL = 397.  Patient is stable and doing well post-operatively.     Her pain is well controlled.   She is tolerating a regular. Not passing flatus yet.   Denies N/V. Denies CP/SOB/lightheadedness/dizziness.   She is ambulating without difficulty.   Voiding spontaneously.    Patient is stable and doing well post-operatively.    - Continue regular diet.  - Increase ambulation.  - Continue motrin, tylenol, oxycodone PRN for pain control.   -Encourage breastfeeding.  -Incisional care and PO instructions reviewed.     Discharge planning    Discussed with MD Laurie Bunyd

## 2022-05-06 NOTE — PROGRESS NOTE ADULT - SUBJECTIVE AND OBJECTIVE BOX
OB Progress Note:  Delivery, POD#1    S: 37yo POD#1 s/p pLTCS for macrosomia, QBL = 397. Her pain is well controlled. She is tolerating a regular diet. Denies N/V. Denies CP/SOB/lightheadedness/dizziness. Sanchez catheter to be removed. Not yet passing flatus.     O:   Vital Signs Last 24 Hrs  T(C): 36.7 (04 May 2022 03:45), Max: 37.5 (03 May 2022 19:41)  T(F): 98 (04 May 2022 03:45), Max: 99.5 (03 May 2022 19:41)  HR: 57 (04 May 2022 03:45) (56 - 92)  BP: 102/68 (04 May 2022 03:45) (95/77 - 124/75)  BP(mean): 89 (04 May 2022 01:30) (70 - 94)  RR: 17 (04 May 2022 03:45) (12 - 17)  SpO2: 97% (04 May 2022 03:45) (96% - 100%)    Labs:  Blood type: A Positive  Rubella IgG: RPR:                           12.8   7.18 >-----------< 161    ( - @ 20:55 )             37.3                  PE:  General: NAD  Abdomen: Mildly distended, appropriately tender, incision c/d/i.  Extremities: No erythema, no pitting edema    
Post-Operative Note, C/S  She is a  36y woman who is now post-operative day: 3    Subjective:  The patient feels well.  She is ambulating.   She is tolerating regular diet.  She denies nausea and vomiting; denies fever.  She is voiding.  Her pain is controlled; incisional pain is appropriate.  She reports normal postpartum bleeding.  She is breastfeeding and formula feeding.    Physical exam:    Vital Signs Last 24 Hrs  T(C): 36.6 (06 May 2022 05:49), Max: 36.7 (05 May 2022 14:29)  T(F): 97.9 (06 May 2022 05:49), Max: 98.1 (05 May 2022 14:29)  HR: 61 (06 May 2022 05:49) (61 - 85)  BP: 127/79 (06 May 2022 05:49) (111/73 - 130/60)  BP(mean): --  RR: 18 (06 May 2022 05:49) (18 - 18)  SpO2: 100% (06 May 2022 05:49) (100% - 100%)    Gen: NAD  Breast: Soft, nontender, not engorged.  Abdomen: Soft, nontender, no distension , firm uterine fundus at umbilicus.  Incision: C/D/I.  Pelvic: Normal lochia noted  Ext: No calf tenderness    Allergies    No Known Allergies      MEDICATIONS  (STANDING):  acetaminophen     Tablet .. 975 milliGRAM(s) Oral <User Schedule>  diphtheria/tetanus/pertussis (acellular) Vaccine (ADAcel) 0.5 milliLiter(s) IntraMuscular once  heparin   Injectable 5000 Unit(s) SubCutaneous every 12 hours  ibuprofen  Tablet. 600 milliGRAM(s) Oral every 6 hours  lactated ringers. 1000 milliLiter(s) (75 mL/Hr) IV Continuous <Continuous>  oxytocin Infusion 333.333 milliUNIT(s)/Min (1000 mL/Hr) IV Continuous <Continuous>  oxytocin Infusion 333.333 milliUNIT(s)/Min (1000 mL/Hr) IV Continuous <Continuous>    MEDICATIONS  (PRN):  diphenhydrAMINE 25 milliGRAM(s) Oral every 6 hours PRN Pruritus  HYDROmorphone  Injectable 1 milliGRAM(s) IV Push every 3 hours PRN Severe Pain (7 - 10)  lanolin Ointment 1 Application(s) Topical every 6 hours PRN Sore Nipples  magnesium hydroxide Suspension 30 milliLiter(s) Oral two times a day PRN Constipation  naloxone Injectable 0.1 milliGRAM(s) IV Push every 3 minutes PRN For ANY of the following changes in patient status:  A. RR LESS THAN 10 breaths per minute, B. Oxygen saturation LESS THAN 90%, C. Sedation score of 6  ondansetron Injectable 4 milliGRAM(s) IV Push every 6 hours PRN Nausea  oxyCODONE    IR 5 milliGRAM(s) Oral every 3 hours PRN Mild Pain (1 - 3)  oxyCODONE    IR 10 milliGRAM(s) Oral every 3 hours PRN Moderate Pain (4 - 6)  oxyCODONE    IR 5 milliGRAM(s) Oral every 3 hours PRN Moderate to Severe Pain (4-10)  oxyCODONE    IR 5 milliGRAM(s) Oral once PRN Moderate to Severe Pain (4-10)  simethicone 80 milliGRAM(s) Chew every 4 hours PRN Gas                
Post-Operative Note, C/S  She is a  36y woman who is now post-operative day:     Subjective:  The patient feels well.  She is ambulating.   She is tolerating regular diet.  She denies nausea and vomiting; denies fever.  She is voiding.  Her pain is controlled; incisional pain is appropriate.  She reports normal postpartum bleeding.  She is breastfeeding.  She is formula feeding.    Physical exam:    Vital Signs Last 24 Hrs  T(C): 36.7 (04 May 2022 06:27), Max: 37.5 (03 May 2022 19:41)  T(F): 98 (04 May 2022 06:27), Max: 99.5 (03 May 2022 19:41)  HR: 67 (04 May 2022 06:27) (56 - 92)  BP: 115/81 (04 May 2022 06:27) (95/77 - 124/75)  BP(mean): 89 (04 May 2022 01:30) (70 - 94)  RR: 19 (04 May 2022 06:27) (12 - 19)  SpO2: 98% (04 May 2022 06:27) (96% - 100%)    Gen: NAD  Breast: Soft, nontender, not engorged.  Abdomen: Soft, nontender, no distension , firm uterine fundus at umbilicus.  Incision: C/D/I.  Pelvic: Normal lochia noted  Ext: No calf tenderness    LABS:                        12.0   12.37 )-----------( 145      ( 04 May 2022 06:48 )             34.8     ABO Interpretation: A (05-03 @ 20:11)  Rh Interpretation: Positive (05-03 @ 20:11)  Antibody Screen: Negative (05-03 @ 20:11)    Rubella status:     Allergies    No Known Allergies    Intolerances      MEDICATIONS  (STANDING):  acetaminophen     Tablet .. 975 milliGRAM(s) Oral <User Schedule>  diphtheria/tetanus/pertussis (acellular) Vaccine (ADAcel) 0.5 milliLiter(s) IntraMuscular once  heparin   Injectable 5000 Unit(s) SubCutaneous every 12 hours  ibuprofen  Tablet. 600 milliGRAM(s) Oral every 6 hours  ketorolac   Injectable 30 milliGRAM(s) IV Push every 6 hours  lactated ringers. 1000 milliLiter(s) (75 mL/Hr) IV Continuous <Continuous>  oxytocin Infusion 333.333 milliUNIT(s)/Min (1000 mL/Hr) IV Continuous <Continuous>  oxytocin Infusion 333.333 milliUNIT(s)/Min (1000 mL/Hr) IV Continuous <Continuous>    MEDICATIONS  (PRN):  diphenhydrAMINE 25 milliGRAM(s) Oral every 6 hours PRN Pruritus  HYDROmorphone  Injectable 1 milliGRAM(s) IV Push every 3 hours PRN Severe Pain (7 - 10)  lanolin Ointment 1 Application(s) Topical every 6 hours PRN Sore Nipples  magnesium hydroxide Suspension 30 milliLiter(s) Oral two times a day PRN Constipation  naloxone Injectable 0.1 milliGRAM(s) IV Push every 3 minutes PRN For ANY of the following changes in patient status:  A. RR LESS THAN 10 breaths per minute, B. Oxygen saturation LESS THAN 90%, C. Sedation score of 6  ondansetron Injectable 4 milliGRAM(s) IV Push every 6 hours PRN Nausea  oxyCODONE    IR 5 milliGRAM(s) Oral every 3 hours PRN Mild Pain (1 - 3)  oxyCODONE    IR 10 milliGRAM(s) Oral every 3 hours PRN Moderate Pain (4 - 6)  oxyCODONE    IR 5 milliGRAM(s) Oral every 3 hours PRN Moderate to Severe Pain (4-10)  oxyCODONE    IR 5 milliGRAM(s) Oral once PRN Moderate to Severe Pain (4-10)  simethicone 80 milliGRAM(s) Chew every 4 hours PRN Gas          
Postop Day  __1_ s/p   C- Section    THERAPY:  [ x ] Spinal morphine   [  ] Epidural morphine   [  ] IV PCA Hydromorphone 1 mg/ml      Sedation Score:	  [ x ] Alert	    [  ] Drowsy        [  ] Arousable	[  ] Asleep	[  ] Unresponsive    Side Effects:	  [ x ] None	     [  ] Nausea        [  ] Pruritus        [  ] Weakness   [  ] Numbness        ASSESSMENT/ PLAN   [   ] Discontinue         [  ] Continue  [ x ]Documentation and Verification of current medications     Comments:       Patient is doing well.  OOBAA. Tolerating clears.  Pain is tolerable.  No residual anesthetic issues or complications noted.  
Post-Operative Note, C/S  She is a  36y woman who is now post-operative day: 2    Subjective:  The patient feels well.  She is ambulating.   She is tolerating regular diet.  She denies nausea and vomiting; denies fever.  She is voiding.  Her pain is controlled; incisional pain is appropriate.  She reports normal postpartum bleeding.  She is breastfeeding.  She is formula feeding.    Physical exam:    Vital Signs Last 24 Hrs  T(C): 36.4 (05 May 2022 05:36), Max: 36.6 (04 May 2022 17:46)  T(F): 97.5 (05 May 2022 05:36), Max: 97.9 (04 May 2022 17:46)  HR: 73 (05 May 2022 05:36) (60 - 74)  BP: 102/65 (05 May 2022 05:36) (102/65 - 112/64)  BP(mean): --  RR: 18 (05 May 2022 05:36) (18 - 18)  SpO2: 100% (05 May 2022 05:36) (99% - 100%)    Gen: NAD  Breast: Soft, nontender, not engorged.  Abdomen: Soft, nontender, no distension , firm uterine fundus at umbilicus.  Incision: C/D/I.  Pelvic: Normal lochia noted  Ext: No calf tenderness    LABS:                        12.0   12.37 )-----------( 145      ( 04 May 2022 06:48 )             34.8       Rubella status:     Allergies    No Known Allergies    Intolerances      MEDICATIONS  (STANDING):  acetaminophen     Tablet .. 975 milliGRAM(s) Oral <User Schedule>  diphtheria/tetanus/pertussis (acellular) Vaccine (ADAcel) 0.5 milliLiter(s) IntraMuscular once  heparin   Injectable 5000 Unit(s) SubCutaneous every 12 hours  ibuprofen  Tablet. 600 milliGRAM(s) Oral every 6 hours  lactated ringers. 1000 milliLiter(s) (75 mL/Hr) IV Continuous <Continuous>  oxytocin Infusion 333.333 milliUNIT(s)/Min (1000 mL/Hr) IV Continuous <Continuous>  oxytocin Infusion 333.333 milliUNIT(s)/Min (1000 mL/Hr) IV Continuous <Continuous>    MEDICATIONS  (PRN):  diphenhydrAMINE 25 milliGRAM(s) Oral every 6 hours PRN Pruritus  HYDROmorphone  Injectable 1 milliGRAM(s) IV Push every 3 hours PRN Severe Pain (7 - 10)  lanolin Ointment 1 Application(s) Topical every 6 hours PRN Sore Nipples  magnesium hydroxide Suspension 30 milliLiter(s) Oral two times a day PRN Constipation  naloxone Injectable 0.1 milliGRAM(s) IV Push every 3 minutes PRN For ANY of the following changes in patient status:  A. RR LESS THAN 10 breaths per minute, B. Oxygen saturation LESS THAN 90%, C. Sedation score of 6  ondansetron Injectable 4 milliGRAM(s) IV Push every 6 hours PRN Nausea  oxyCODONE    IR 5 milliGRAM(s) Oral every 3 hours PRN Mild Pain (1 - 3)  oxyCODONE    IR 10 milliGRAM(s) Oral every 3 hours PRN Moderate Pain (4 - 6)  oxyCODONE    IR 5 milliGRAM(s) Oral every 3 hours PRN Moderate to Severe Pain (4-10)  oxyCODONE    IR 5 milliGRAM(s) Oral once PRN Moderate to Severe Pain (4-10)  simethicone 80 milliGRAM(s) Chew every 4 hours PRN Gas

## 2022-06-21 NOTE — OB PROVIDER DELIVERY SUMMARY - NS_ADMITROM_OBGYN_ALL_OB
No
ML 2ry to volume contraction in relation to diarrhea, received 1000 ml NS bolus by ED team earlier, ordered additional 1500 ml of LR bolus, currently on maintenance IVF, monitor I & O, avoid nephrotoxins, trend renal indices.

## 2022-07-25 NOTE — ED PROVIDER NOTE - MUSCULOSKELETAL, MLM
Products Recommended: EltaMD
Detail Level: Generalized
General Sunscreen Counseling: I recommended a broad spectrum sunscreen with a SPF of 30 or higher.  I explained that SPF 30 sunscreens block approximately 97 percent of the sun's harmful rays.  Sunscreens should be applied at least 15 minutes prior to expected sun exposure and then every 2 hours after that as long as sun exposure continues. If swimming or exercising sunscreen should be reapplied every 45 minutes to an hour after getting wet or sweating.  One ounce, or the equivalent of a shot glass full of sunscreen, is adequate to protect the skin not covered by a bathing suit. I also recommended a lip balm with a sunscreen as well. Sun protective clothing can be used in lieu of sunscreen but must be worn the entire time you are exposed to the sun's rays.
range of motion is not limited, no muscle or joint tenderness

## 2022-12-06 NOTE — LACTATION INITIAL EVALUATION - PRETERM DELIVERIES, OB PROFILE
0 [Well Developed] : well developed [Well Nourished] : well nourished [No Acute Distress] : no acute distress [Normal Conjunctiva] : normal conjunctiva [Normal Venous Pressure] : normal venous pressure [Normal S1, S2] : normal S1, S2 [No Rub] : no rub [No Gallop] : no gallop [Murmur] : murmur [Clear Lung Fields] : clear lung fields [Good Air Entry] : good air entry [No Respiratory Distress] : no respiratory distress  [Soft] : abdomen soft [Non Tender] : non-tender [No Masses/organomegaly] : no masses/organomegaly [Normal Bowel Sounds] : normal bowel sounds [Normal Gait] : normal gait [No Edema] : no edema [No Cyanosis] : no cyanosis [No Clubbing] : no clubbing [No Varicosities] : no varicosities [No Rash] : no rash [No Skin Lesions] : no skin lesions [Moves all extremities] : moves all extremities [No Focal Deficits] : no focal deficits [Normal Speech] : normal speech [Alert and Oriented] : alert and oriented [Normal memory] : normal memory [Carotid Bruit] : carotid bruit [de-identified] : L [de-identified] : 2/6 ANNAMARIE --> Axilla

## 2023-03-30 NOTE — DISCHARGE NOTE OB - NS AS DC STROKE DX YN
Impression: Open angle with borderline findings, low risk, bilateral: H40.013. POAG OU 
IOP 17/17 stable  Plan: 6 month IOP checks no

## 2023-04-12 NOTE — OB PROVIDER H&P - HISTORY OF PRESENT ILLNESS
36 year old female  at 40.1 weeks gestation who presents from office noted with NRNST and pt c/o Decreased FM for past 2 days  decreased in frequency and strength of movements    stated had scan today in office and BPP  efw 4848g   denied any GDM  stated had prior  9.2 #  without complication.     med hx denied   surg hx DVC x1   NKDA   OB hx  3/23/20  40.3 weeks  9.2#( 4139G)    SAB x2    gyn hx denied   
Home

## 2024-09-25 NOTE — OB RN PATIENT PROFILE - EDUCATION PROVIDED ON BREASTFEEDING ASSESSMENT AND INSTRUCTION; INCLUDING POSITIONING, NEWBORN ATTACHMENT, AND COMFORT
[Follow Up] : a follow up visit [Patient] : patient [Mother] : mother [FreeTextEntry1] : left ASIS avulsion fracture sustained 08/15/2024 Statement Selected

## 2025-05-18 ENCOUNTER — NON-APPOINTMENT (OUTPATIENT)
Age: 40
End: 2025-05-18